# Patient Record
Sex: FEMALE | Race: WHITE | Employment: UNEMPLOYED | URBAN - NONMETROPOLITAN AREA
[De-identification: names, ages, dates, MRNs, and addresses within clinical notes are randomized per-mention and may not be internally consistent; named-entity substitution may affect disease eponyms.]

---

## 2017-01-05 ENCOUNTER — TELEPHONE (OUTPATIENT)
Dept: BEHAVIORAL HEALTH | Facility: OTHER | Age: 32
End: 2017-01-05

## 2017-01-05 NOTE — TELEPHONE ENCOUNTER
[facility]  @FLOW([id number])@      Social Work Care Navigator Note      Patient: Kina Pond  Date: January 5, 2017  Preferred Name: Kina    Previous PHQ-9:   PHQ-9 SCORE 9/15/2016 11/10/2016 12/1/2016   Total Score - - -   Total Score 16 16 9     Previous DANIEL-7:   DANIEL-7 SCORE 9/15/2016 11/10/2016 12/1/2016   Total Score 21 20 17     ACE LEVEL:  ACE Score (Last Two) 11/10/2016 12/1/2016   ACE Raw Score 28 32   Activation Score 50 62.6   ACE Level 2 3       Preferred Contact: @JACOB([id number])@  Type of Contact Today: Phone call (not reached/unavailable)      Data: (subjective / Objective):  Patient Goals Areas: @FLOW([id number])@  Patient Stated Goals: @FLOW([id number])@  Recent ED/IP Admission or Discharge?   None  Attempted to reach patient, but was unsuccessful.  Plan to attempt again.  Shannan Hirsch

## 2017-01-05 NOTE — TELEPHONE ENCOUNTER
"[facility]  @FLOW([id number])@      Social Work Care Navigator Note      Patient: Kina Pond  Date: January 5, 2017  Preferred Name: Kina    Previous PHQ-9:   PHQ-9 SCORE 9/15/2016 11/10/2016 12/1/2016   Total Score - - -   Total Score 16 16 9     Previous DANIEL-7:   DANIEL-7 SCORE 9/15/2016 11/10/2016 12/1/2016   Total Score 21 20 17     ACE LEVEL:  ACE Score (Last Two) 11/10/2016 12/1/2016   ACE Raw Score 28 32   Activation Score 50 62.6   ACE Level 2 3       Preferred Contact: @JACOB([id number])@  Type of Contact Today: Phone call (patient reached)      Data: (subjective / Objective):  Patient Goals Areas: @FLOW([id number])@  Patient Stated Goals: @FLOW([id number])@  Recent ED/IP Admission or Discharge?   None  Patient Goals Areas: @FLOW([id number])@  Patient Stated Goals: @FLOW([id number])@  Recent ED/IP Admission or Discharge?   None    Objectives Addressed Today:  Has an [facility] worker- Barb   [facility], once consent is signed will send DA     Current Stressors / Issues:  Still working on 's unemployment vs [facility] cash assistance- on the way to [employer] [place] to sign ROSA for them to talk to [facility]   Kina is having trouble controlling her anxiety, she has trouble doing anything that requires \"keeping count or thinking\"     Intervention:  Motivational Interviewing: Expressed Empathy/Understanding, Supported Autonomy, Collaboration, Evocation and Permission to raise concern or advise   Target Behavior(s): Explored thoughts and readiness to participate in individual therapy and Explored current sleep hygeine and patient's perception and knowledge of relationship to mood    Assessment: (Progress on Goals / Homework):  Working with [facility] worker on relaxation-     Plan: (Homework, other):   This writer will look into other relaxation strategies that might help her better  Patient was encouraged to continue to seek condition-related information and education.      Scheduled a " Clinic follow up appointment with TAMEKA SAN in 1 week     Patient has set self-identified goals and will monitor progress until the next appointment on: 01/10/17.     Shannan Hirsch, Social Work Care Coordinator               Next 5 appointments (look out 90 days)     Kenrick 10, 2017 11:30 AM   (Arrive by 11:15 AM)   Return Visit with Shannan Hirsch   [facility] ([facility])    [address] [place] [address] [phone]

## 2017-01-09 ENCOUNTER — TELEPHONE (OUTPATIENT)
Dept: BEHAVIORAL HEALTH | Facility: OTHER | Age: 32
End: 2017-01-09

## 2017-01-09 NOTE — TELEPHONE ENCOUNTER
Patient is coming in tomorrow for help in coordination with [facility] and unemployment of . She is having high anxiety at this time because of these external factors.

## 2017-01-10 ENCOUNTER — TELEPHONE (OUTPATIENT)
Dept: BEHAVIORAL HEALTH | Facility: OTHER | Age: 32
End: 2017-01-10

## 2017-01-10 ENCOUNTER — OFFICE VISIT (OUTPATIENT)
Dept: BEHAVIORAL HEALTH | Facility: OTHER | Age: 32
End: 2017-01-10
Attending: PHYSICIAN ASSISTANT
Payer: MEDICAID

## 2017-01-10 DIAGNOSIS — F48.9 DEFERRED DIAGNOSIS ON AXIS I: Primary | ICD-10-CM

## 2017-01-10 NOTE — PROGRESS NOTES
"[facility]         Social Work Care Navigator Note      Patient: Kina Pond  Date: January 10, 2017  Preferred Name: Kina    Previous PHQ-9:   PHQ-9 SCORE 9/15/2016 11/10/2016 12/1/2016   Total Score - - -   Total Score 16 16 9     Previous DANIEL-7:   DANIEL-7 SCORE 9/15/2016 11/10/2016 12/1/2016   Total Score 21 20 17     ACE LEVEL:  ACE Score (Last Two) 11/10/2016 12/1/2016   ACE Raw Score 28 32   Activation Score 50 62.6   ACE Level 2 3       Preferred Contact: @[facility]([id number])@  Type of Contact Today: Face to Face in Clinic      Data: (subjective / Objective):  Patient Goals Areas: Financial  Patient Stated Goals:\"need help with county\"    Recent ED/IP Admission or Discharge?   None  Patient Goals Areas:    Patient Stated Goals:    Recent ED/IP Admission or Discharge?   None    Objectives Addressed Today:  No MFIP- [facility] keeps asking for verifications, and then says they are not giving the right ones. Behind on rent, no money, no food. Asked family for     Current Stressors / Issues:  See above. \"When I go into the [facility] they think I am Lying\"    Intervention:  Motivational Interviewing: Expressed Empathy/Understanding, Supported Autonomy, Collaboration, Evocation and Open-ended questions   Target Behavior(s): Explored ways to be able to pay for medication    Assessment: (Progress on Goals / Homework): Will bring in all documentation from [facility]      Plan: (Homework, other): Will call the [facility] about what precisely is needed for MFIP arpit.  Patient was encouraged to continue to seek condition-related information and education.      Scheduled a Phone follow up appointment with TAMEKA SAN in 1 week     Patient has set self-identified goals and will monitor progress until the next appointment on: 01/17/17.     Shannan Hirsch, Social Work Care Coordinator                 HPI      ROS      Physical Exam      "

## 2017-01-12 ENCOUNTER — TRANSFERRED RECORDS (OUTPATIENT)
Dept: HEALTH INFORMATION MANAGEMENT | Facility: HOSPITAL | Age: 32
End: 2017-01-12

## 2017-01-12 ENCOUNTER — TELEPHONE (OUTPATIENT)
Dept: BEHAVIORAL HEALTH | Facility: OTHER | Age: 32
End: 2017-01-12

## 2017-01-12 NOTE — TELEPHONE ENCOUNTER
"[facility]  @FLOW([id number])@      Social Work Care Navigator Note      Patient: Kina Pond  Date: January 12, 2017  Preferred Name: Kina    Previous PHQ-9:   PHQ-9 SCORE 9/15/2016 11/10/2016 12/1/2016   Total Score - - -   Total Score 16 16 9     Previous DANIEL-7:   DANIEL-7 SCORE 9/15/2016 11/10/2016 12/1/2016   Total Score 21 20 17     ACE LEVEL:  ACE Score (Last Two) 11/10/2016 12/1/2016   ACE Raw Score 28 32   Activation Score 50 62.6   ACE Level 2 3       Preferred Contact: @JACOB([id number])@  Type of Contact Today: Phone call (patient reached)      Data: (subjective / Objective):  Patient Goals Areas: @FLOW([id number])@  Patient Stated Goals: @FLOW([id number])@  Recent ED/IP Admission or Discharge?   None  Patient Goals Areas: @FLOW([id number])@  Patient Stated Goals: @FLOW([id number])@  Recent ED/IP Admission or Discharge?   None    Patient called that she was reinstated with MFIP 1/11/17. She had brought to [facility] all the paperwork-to hand it in , inperson.  The worker was very curse with her, and told the patient she refused to call this writer back- because my County ROSA is only for \"healthcare\" . It is actually for care coordination. Kina has obtained [facility] back and can now get her medicines filled.    Scheduled a Phone follow up appointment with TAMEKA SAN in 1 week     Patient has set self-identified goals and will monitor progress until the next appointment on: 01/16/17.     Shannan Hirsch, Social Work Care Coordinator               Next 5 appointments (look out 90 days)     Jan 16, 2017  8:30 AM   (Arrive by 8:15 AM)   SHORT with DAYANNA Goodman   [facility] ([facility])    [address] [place] [address]   [phone]                  "

## 2017-01-14 ENCOUNTER — HOSPITAL ENCOUNTER (EMERGENCY)
Facility: HOSPITAL | Age: 32
Discharge: HOME | End: 2017-01-14
Attending: NURSE PRACTITIONER | Admitting: NURSE PRACTITIONER
Payer: MEDICAID

## 2017-01-14 VITALS
RESPIRATION RATE: 16 BRPM | DIASTOLIC BLOOD PRESSURE: 67 MMHG | TEMPERATURE: 99.2 F | OXYGEN SATURATION: 99 % | SYSTOLIC BLOOD PRESSURE: 108 MMHG | HEART RATE: 87 BPM

## 2017-01-14 DIAGNOSIS — M79.641 PAIN OF RIGHT HAND: ICD-10-CM

## 2017-01-14 PROCEDURE — 99214 OFFICE O/P EST MOD 30 MIN: CPT | Mod: 25

## 2017-01-14 PROCEDURE — 25000125 ZZHC RX 250: Performed by: NURSE PRACTITIONER

## 2017-01-14 PROCEDURE — 96372 THER/PROPH/DIAG INJ SC/IM: CPT

## 2017-01-14 PROCEDURE — 99213 OFFICE O/P EST LOW 20 MIN: CPT | Performed by: NURSE PRACTITIONER

## 2017-01-14 PROCEDURE — 73140 X-RAY EXAM OF FINGER(S): CPT | Mod: TC,RT

## 2017-01-14 RX ORDER — KETOROLAC TROMETHAMINE 30 MG/ML
60 INJECTION, SOLUTION INTRAMUSCULAR; INTRAVENOUS ONCE
Status: COMPLETED | OUTPATIENT
Start: 2017-01-14 | End: 2017-01-14

## 2017-01-14 RX ADMIN — KETOROLAC TROMETHAMINE 60 MG: 30 INJECTION, SOLUTION INTRAMUSCULAR; INTRAVENOUS at 17:31

## 2017-01-14 NOTE — ED AVS SNAPSHOT
[facility]    [address] [address]    Phone:  [phone]                                       Kina Pond   MRN: [id number]    Department:  [facility]   Date of Visit:  1/14/2017           Patient Information     Date Of Birth          1985        Your diagnoses for this visit were:     Pain of right hand 5th MCP joint       You were seen by Ketty Casey NP.      Follow-up Information     Follow up with [facility].    Specialty:  EMERGENCY MEDICINE    Why:  As needed, If symptoms worsen, or concerns develop    Contact information:    [address] [place] [address] [phone]    Additional information:    From [place]: Take [address]. Turn left at [address]. Turn left at the first stoplight on [address]. At the first stop sign, take a right onto [address]. Take a left into the parking lot and continue through until you reach the North enterance of the building.       From [place]: Take [address]. Take the [address] ramp towards [place]. Turn left onto [address]. Take a slight right onto [address]. Turn left at the first stoplight on [address]. At the first stop sign, take a right onto [address]. Take a left into the parking lot and continue through until you reach the North enterance of the building.       From [place]: Take [address]. Take a right at [address]. At the first stop sign, take a right onto [address]. Take a left into the parking lot and continue through until you reach the North enterance of the building.         Follow up with Makayla Khoury PA.    Specialty:  Family Practice    Why:  As needed, if symptoms do not improve    Contact information:    [facility]  [address]  [place] [address]  [phone]        Discharge References/Attachments     HAND SPRAIN (ENGLISH)       Future Appointments        Provider Department Dept Phone Center    1/16/2017 8:30 AM DAYANNA Cai [facility] [place] [phone] [facility]         Review of your medicines      Our records show that you are taking the medicines listed below. If these are incorrect, please call your family doctor or clinic.        Dose / Directions Last dose taken    ALPRAZolam 0.5 MG tablet   Commonly known as:  XANAX   Quantity:  15 tablet        TAKE 1 TABLET BY MOUTH TWICE DAILY AS NEEDED   Refills:  0        buPROPion 150 MG 12 hr tablet   Commonly known as:  WELLBUTRIN SR   Dose:  150 mg   Quantity:  60 tablet        Take 1 tablet (150 mg) by mouth 2 times daily   Refills:  1        escitalopram 5 MG tablet   Commonly known as:  LEXAPRO   Quantity:  30 tablet        TAKE 1 TABLET BY MOUTH DAILY   Refills:  3        etonogestrel 68 MG Impl   Commonly known as:  IMPLANON/NEXPLANON   Dose:  1 each        1 each (68 mg) by Subdermal route continuous   Refills:  0        polyethylene glycol powder   Commonly known as:  MIRALAX   Dose:  1 capful   Quantity:  510 g        Take 17 g (1 capful) by mouth daily As needed for irritable bowel sx.  If no bm in 2 day take one scoop   Refills:  1        traZODone 50 MG tablet   Commonly known as:  DESYREL   Quantity:  15 tablet        TAKE 1 TABLET(50 MG) BY MOUTH EVERY NIGHT AS NEEDED FOR SLEEP   Refills:  3                Orders Needing Specimen Collection     None      Pending Results     No orders found from 1/13/2017 to 1/15/2017.            Pending Culture Results     No orders found from 1/13/2017 to 1/15/2017.            Thank you for choosing [facility]       Thank you for choosing [facility] for your care. Our goal is always to provide you with excellent care. Hearing back from our patients is one way we can continue to improve our services. Please take a few minutes to complete the written survey that you may receive in the mail after you visit with us. Thank  "you!        [employer] Information     [employer] lets you send messages to your doctor, view your test results, renew your prescriptions, schedule appointments and more. To sign up, go to [email or web address] . Click on \"Log in\" on the left side of the screen, which will take you to the Welcome page. Then click on \"Sign up Now\" on the right side of the page.     You will be asked to enter the access code listed below, as well as some personal information. Please follow the directions to create your username and password.     Your access code is: [email or web address]  Expires: 2017  5:33 PM     Your access code will  in 90 days. If you need help or a new code, please call your [facility] clinic or [phone].        Care EveryWhere ID     This is your Care EveryWhere ID. This could be used by other organizations to access your [facility] medical records  [id number]        After Visit Summary       This is your record. Keep this with you and show to your community pharmacist(s) and doctor(s) at your next visit.                  "

## 2017-01-14 NOTE — ED NOTES
Pt presents today with spouse and children for right pinky finger pain after wrestling with kids (playfully) and she heard a pop.

## 2017-01-14 NOTE — ED AVS SNAPSHOT
[facility]    [address] [place] [address]    Phone:  [phone]                                       Kina Pond   MRN: [id number]    Department[facility]  [facility]   Date of Visit:  1/14/2017           After Visit Summary Signature Page     I have received my discharge instructions, and my questions have been answered. I have discussed any challenges I see with this plan with the nurse or doctor.    ..........................................................................................................................................  Patient/Patient Representative Signature      ..........................................................................................................................................  Patient Representative Print Name and Relationship to Patient    ..................................................               ................................................  Date                                            Time    ..........................................................................................................................................  Reviewed by Signature/Title    ...................................................              ..............................................  Date                                                            Time

## 2017-01-15 ASSESSMENT — ENCOUNTER SYMPTOMS
CONSTITUTIONAL NEGATIVE: 1
ARTHRALGIAS: 1

## 2017-01-15 NOTE — ED PROVIDER NOTES
History     Chief Complaint   Patient presents with     Hand Pain     The history is provided by the patient. No  was used.     Kina Pond is a 31 year old female who presents today with a CC of right 5th finger pain.  She states she was wrestling with her family, heard a pop in her right 5th finger and felt immediate pain.  She has not taken anything for pain.  No numbness or tingling.  Pain is 8/10.      I have reviewed the Medications, Allergies, Past Medical and Surgical History, and Social History in the Epic system.    Review of Systems   Constitutional: Negative.    Musculoskeletal: Positive for arthralgias.       Physical Exam   BP: 108/67 mmHg  Pulse: 87  Temp: 99.2  F (37.3  C)  Resp: 16  SpO2: 99 %    Physical Exam   Constitutional: She appears well-developed and well-nourished.   Cardiovascular: Normal rate.    Pulmonary/Chest: Effort normal.   Musculoskeletal:        Right hand: She exhibits decreased range of motion (5th finger) and bony tenderness (5th finger MCP joint with movement and palpation). She exhibits normal two-point discrimination, normal capillary refill, no deformity, no laceration and no swelling. Normal sensation noted. Decreased strength (5th finger due to pain) noted.   Nursing note and vitals reviewed.      ED Course   Procedures    Results for orders placed or performed during the hospital encounter of 01/14/17   XR Finger Right G/E 2 Views    Narrative    RIGHT FIFTH FINGER TWO VIEWS    HISTORY:  Fifth finger pain and injury.    COMPARISON:  None.    IMPRESSION:  NORMAL RIGHT FIFTH FINGER.  Exam Date: Jan 14, 2017 05:37:41 PM  Author: KAMARI SMITH  This report is preliminary and transcribed         Patient fitted with ulnar gutter splint    Medications   ketorolac (TORADOL) injection 60 mg (60 mg Intramuscular Given 1/14/17 1354)     Observed for a minimum of 20 minutes, tolerated medications well, no adverse efects noted, reports pain is improved on  discharge.    Assessments & Plan (with Medical Decision Making)     I have reviewed the nursing notes.    I have reviewed the findings, diagnosis, plan and need for follow up with the patient.  ASSESSMENT / PLAN:  (M79.641) Pain of right hand  Comment: 5th MCP joint  Plan:  Rest, ice or heat, elevate   Ibuprofen/tylenol as needed for pain   Splint as needed for comfort/support   Follow up with PCP in 1-2 weeks if symptoms are not improving, sooner if symptoms are worsening    Discharge Medication List as of 1/14/2017  5:33 PM          Final diagnoses:   Pain of right hand - 5th MCP joint       1/14/2017   [facility]      Ketty Casey NP  01/15/17 2486

## 2017-01-18 ENCOUNTER — OFFICE VISIT (OUTPATIENT)
Dept: FAMILY MEDICINE | Facility: OTHER | Age: 32
End: 2017-01-18
Attending: PHYSICIAN ASSISTANT
Payer: MEDICAID

## 2017-01-18 VITALS
RESPIRATION RATE: 16 BRPM | TEMPERATURE: 98.7 F | SYSTOLIC BLOOD PRESSURE: 80 MMHG | HEIGHT: 63 IN | DIASTOLIC BLOOD PRESSURE: 46 MMHG | OXYGEN SATURATION: 98 % | WEIGHT: 118 LBS | BODY MASS INDEX: 20.91 KG/M2 | HEART RATE: 92 BPM

## 2017-01-18 DIAGNOSIS — M54.42 CHRONIC BILATERAL LOW BACK PAIN WITH LEFT-SIDED SCIATICA: ICD-10-CM

## 2017-01-18 DIAGNOSIS — F51.02 TRANSIENT INSOMNIA: ICD-10-CM

## 2017-01-18 DIAGNOSIS — G89.29 CHRONIC BILATERAL LOW BACK PAIN WITH LEFT-SIDED SCIATICA: ICD-10-CM

## 2017-01-18 DIAGNOSIS — F33.1 MODERATE EPISODE OF RECURRENT MAJOR DEPRESSIVE DISORDER (H): ICD-10-CM

## 2017-01-18 DIAGNOSIS — F41.1 GAD (GENERALIZED ANXIETY DISORDER): ICD-10-CM

## 2017-01-18 DIAGNOSIS — F45.8 HYPERVENTILATION SYNDROME: ICD-10-CM

## 2017-01-18 DIAGNOSIS — Z71.89 ACP (ADVANCE CARE PLANNING): Primary | Chronic | ICD-10-CM

## 2017-01-18 PROCEDURE — 99212 OFFICE O/P EST SF 10 MIN: CPT

## 2017-01-18 PROCEDURE — 99214 OFFICE O/P EST MOD 30 MIN: CPT | Performed by: PHYSICIAN ASSISTANT

## 2017-01-18 RX ORDER — ESCITALOPRAM OXALATE 5 MG/1
5 TABLET ORAL DAILY
Qty: 30 TABLET | Refills: 11 | Status: SHIPPED | OUTPATIENT
Start: 2017-01-18 | End: 2017-04-14

## 2017-01-18 RX ORDER — CYCLOBENZAPRINE HCL 10 MG
5-10 TABLET ORAL 3 TIMES DAILY PRN
Qty: 30 TABLET | Refills: 1 | Status: SHIPPED | OUTPATIENT
Start: 2017-01-18

## 2017-01-18 RX ORDER — BUPROPION HYDROCHLORIDE 150 MG/1
150 TABLET, EXTENDED RELEASE ORAL 2 TIMES DAILY
Qty: 60 TABLET | Refills: 1 | Status: SHIPPED | OUTPATIENT
Start: 2017-01-18 | End: 2017-03-25

## 2017-01-18 RX ORDER — TRAZODONE HYDROCHLORIDE 50 MG/1
TABLET, FILM COATED ORAL
Qty: 30 TABLET | Refills: 3 | Status: SHIPPED | OUTPATIENT
Start: 2017-01-18 | End: 2017-04-14

## 2017-01-18 ASSESSMENT — PATIENT HEALTH QUESTIONNAIRE - PHQ9: 5. POOR APPETITE OR OVEREATING: NEARLY EVERY DAY

## 2017-01-18 ASSESSMENT — ANXIETY QUESTIONNAIRES
1. FEELING NERVOUS, ANXIOUS, OR ON EDGE: NEARLY EVERY DAY
7. FEELING AFRAID AS IF SOMETHING AWFUL MIGHT HAPPEN: NEARLY EVERY DAY
3. WORRYING TOO MUCH ABOUT DIFFERENT THINGS: NEARLY EVERY DAY
2. NOT BEING ABLE TO STOP OR CONTROL WORRYING: NEARLY EVERY DAY
GAD7 TOTAL SCORE: 20
5. BEING SO RESTLESS THAT IT IS HARD TO SIT STILL: MORE THAN HALF THE DAYS
6. BECOMING EASILY ANNOYED OR IRRITABLE: NEARLY EVERY DAY
IF YOU CHECKED OFF ANY PROBLEMS ON THIS QUESTIONNAIRE, HOW DIFFICULT HAVE THESE PROBLEMS MADE IT FOR YOU TO DO YOUR WORK, TAKE CARE OF THINGS AT HOME, OR GET ALONG WITH OTHER PEOPLE: SOMEWHAT DIFFICULT

## 2017-01-18 ASSESSMENT — PAIN SCALES - GENERAL: PAINLEVEL: NO PAIN (0)

## 2017-01-18 NOTE — PATIENT INSTRUCTIONS
Thank you for choosing [facility].   I appreciate the opportunity to serve you and look forward to supporting your healthcare needs in the future. Please contact me with any questions or concerns that you may have.    Sincerely,    Makayla Khoury RN, PA-C            Low Back Pain            What is low back pain?   Low back pain is pain and stiffness in the lower back. It is one of the most common reasons people miss work.   How does it occur?   Your lower back is called your lumbar spine. It is made up of 5 bones called lumbar vertebrae. In between the vertebrae are shock absorbers called disks. Back pain can occur from an injury to the vertebrae or when a disk bulges or herniates.   Low back pain is usually caused when a ligament or muscle holding a vertebra in its proper position is strained. Vertebrae are bones that make up the spinal column through which the spinal cord passes. When these muscles or ligaments become weak or strained, the spine loses its stability, resulting in pain.   Low back pain can occur if your job involves lifting and carrying heavy objects, or if you spend a lot of time sitting or standing in one position or bending over. It can be caused by a fall or by unusually strenuous exercise. It can be brought on by the tension and stress that cause headaches in some people. It can even be brought on by violent sneezing or coughing.   People who are overweight may have low back pain because of the added stress on their back.   Back pain may occur when the muscles, joints, bones, and connective tissues of the back become inflamed as a result of an infection or an immune system problem. Arthritic disorders as well as some congenital and degenerative conditions may cause back pain.   Back pain accompanied by loss of bladder or bowel control, trouble moving your legs, or numbness or tingling in your arms or legs requires immediate medical treatment.   What are the symptoms?   Symptoms  include:   pain in the back or legs   stiffness, spasm, or limited motion   The pain may be constant or may happen only in certain positions. It may get worse when you cough, sneeze, bend, twist, or strain during a bowel movement. The pain may be in only one spot or may spread to other areas, most commonly down the buttocks and into the back of the thigh.   A low back strain typically does not produce pain past the knee into the calf or foot. Tingling or numbness in the calf or foot may indicate a herniated disk or pinched nerve.   Be sure to see your healthcare provider if:   You have weakness in your leg, especially if you cannot lift your foot, because this may be a sign of nerve damage.   You have new bowel or bladder problems as well as back pain, which may be a sign of severe injury to your spinal cord.   You have pain that gets worse despite treatment.   How is it diagnosed?   Your healthcare provider will review your medical history and examine you. You may have X-rays, an MRI, CT scan, or a bone scan.   How is it treated?   To treat this condition:   Put an ice pack, gel pack, or package of frozen vegetables, wrapped in a cloth on the area every 3 to 4 hours, for up to 20 minutes at a time for the first 2 or 3 days.   Use a heating pad or hot water bottle. Don't let the heating pad get too hot, and don't fall asleep with it. You could get a burn.   Rest in bed on a firm mattress. Often it helps to lie on your back with your knees raised on a pillow. However, some people prefer to lie on their side with their knees bent. It's best to try to stay active, so try not to rest in bed longer than 1 to 2 days.   Take muscle relaxants as recommended by your healthcare provider.   Take an anti-inflammatory such as ibuprofen, or other medicine as directed by your provider. Nonsteroidal anti-inflammatory medicines (NSAIDs) may cause stomach bleeding and other problems. These risks increase with age. Read the label and  take as directed. Unless recommended by your healthcare provider, do not take for more than 10 days.   Get a back massage by a trained person.   Wear a belt or corset to support your back.   Do the exercises recommended by your provider. Your provider may also prescribe physical therapy.   Talk with a counselor, if your back pain is related to tension caused by emotional problems.   When the pain is gone, ask your healthcare provider about starting an exercise program such as the following:   Exercise moderately every day, using stretching and warm-up exercises suggested by your provider or physical therapist.   Exercise vigorously for about 30 minutes 3 times a week by walking, swimming, using a stationary bicycle, or doing low-impact aerobics.   Exercising regularly will not only help your back, it will also help keep you healthier overall.   How long will the effects last?   The effects of back pain last as long as the cause exists or until your body recovers from the strain, usually a day or two but sometimes weeks.   How can I take care of myself?   In addition to the treatment described above, keep in mind these suggestions:   Practice good posture. Stand with your head up, shoulders straight, chest forward, weight balanced evenly on both feet, and pelvis tucked in.   Lose weight if you are overweight   Keep your core muscles strong. These are your abdominal and back muscles.   Sleep without a pillow under your head.   Pain is the best way to  the pace you should set in increasing your activity and exercise. Minor discomfort, stiffness, soreness, and mild aches need not interfere with activity. However, limit your activities temporarily if:   Your symptoms return.   The pain increases when you are more active.   The pain increases within 24 hours after a new or higher level of activity.   When can I return to my normal activities?   Everyone recovers from an injury at a different rate. Return to your  activities depends on how soon your back recovers, not by how many days or weeks it has been since your injury has occurred. In general, the longer you have symptoms before you start treatment, the longer it will take to get better. The goal is to return to your normal activities as soon as is safely possible. If you return too soon you may worsen your injury.   It is important that you have fully recovered from your low back pain before you return to any strenuous activity. You must be able to have the same range of motion that you had before your injury. You must be able to walk and twist without pain.   What can I do to help prevent low back pain?   You can reduce the strain on your back by doing the following:   Don't push with your arms when you move a heavy object. Turn around and push backwards so the strain is taken by your legs.   Whenever you sit, sit in a straight-backed chair and hold your spine against the back of the chair.   Bend your knees and hips and keep your back straight when you lift a heavy object.   Avoid lifting heavy objects higher than your waist.   Hold packages you carry close to your body, with your arms bent.   Use a footrest for one foot when you stand or sit in one spot for a long time. This keeps your back straight.   Bend your knees when you bend over.   Sit close to the pedals when you drive and use your seat belt and a hard backrest or pillow.   Lie on your side with your knees bent when you sleep or rest. It may help to put a pillow between your knees.   Put a pillow under your knees when you sleep on your back.   Raise the foot of the bed 8 inches to discourage sleeping on your stomach unless you have other problems that require that you keep your head elevated.   To rest your back, hold each of these positions for 5?minutes or longer:   Lie on your back, bend your knees, and put pillows under your knees.   Lie on your back on the floor with a pillow under your neck. Bend your  knees to a 90-degree angle, and put your lower legs and feet on a chair.   Lie on your back, bend your knees, and bring one knee up to your chest and hold it there. Repeat with the other knee, then bring both knees to your chest. When holding your knee to your chest, grab your thigh rather than your lower leg to avoid over flexing your knee.     Published by [employer].  This content is reviewed periodically and is subject to change as new health information becomes available. The information is intended to inform and educate and is not a replacement for medical evaluation, advice, diagnosis or treatment by a healthcare professional.   Developed by Zakia Greene RN, [place], and [facility].   ? 2010 [facility] and/or its affiliates. All Rights Reserved.           Low Back Pain Exercise          Standing hamstring stretch: Put the heel of one leg on a stool about 15 inches high. Keep your leg straight. Lean forward, bending at the hips until you feel a mild stretch in the back of your thigh. Make sure you do not roll your shoulders or bend at the waist when doing this. You want to stretch your leg, not your lower back. Hold the stretch for 15 to 30 seconds. Repeat with each leg 3 times.   Cat and camel: Get down on your hands and knees. Let your stomach sag, allowing your back to curve downward. Hold this position for 5 seconds. Then arch your back and hold for 5 seconds. Do 3 sets of 10.   Quadruped arm and leg raise: Get down on your hands and knees. Pull in your belly button and tighten your abdominal muscles to stiffen your spine. While keeping your abdominals tight, raise one arm and the opposite leg away from you. Hold this position for 5 seconds. Lower your arm and leg slowly and change sides. Do this 10 times on each side.   Pelvic tilt: Lie on your back with your knees bent and your feet flat on the floor. Tighten your abdominal muscles and push your lower back into the floor. Hold this position for 5  seconds, then relax. Do 3 sets of 10.   Partial curl: Lie on your back with your knees bent and your feet flat on the floor. Tighten your stomach muscles. Tuck your chin to your chest. With your hands stretched out in front of you, curl your upper body forward until your shoulders clear the floor. Hold this position for 3 seconds. Don't hold your breath. It helps to breathe out as you lift your shoulders up. Relax back to the floor. Repeat 10 times. Build to 3 sets of 10. To challenge yourself, clasp your hands behind your head and keep your elbows out to the side.   Gluteal stretch: Lie on your back with both knees bent. Rest the ankle of one leg over the knee of your other leg. Grasp the thigh of the bottom leg and pull toward your chest. You will feel a stretch along the buttocks and possibly along the outside of your hip. Hold the stretch for 15 to 30 seconds. Repeat 3 times with each leg.   Extension exercise:   0. Lie face down on the floor for 5 minutes. If this hurts too much, lie face down with a pillow under your stomach. This should relieve your leg or back pain. When you can lie on your stomach for 5 minutes without a pillow, you can continue with Part B of this exercise.   0. After lying on your stomach for 5 minutes, prop yourself up on your elbows for another 5 minutes. If you can do this without having more leg or buttock pain, you can start doing part C of this exercise.   0. Lie on your stomach with your hands under your shoulders. Then press down on your hands and extend your elbows while keeping your hips flat on the floor. Hold for 1 second and lower yourself to the floor. Do 3 to 5 sets of 10 repetitions. Rest for 1 minute between sets. You should have no pain in your legs when you do this, but it is normal to feel some pain in your lower back.   Do this exercise several times a day.   Side plank: Lie on your side with your legs, hips, and shoulders in a straight line. Prop yourself up onto  your forearm so your elbow is directly under your shoulder. Lift your hips off the floor and balance on your forearm and the outside of your foot. Try to hold this position for 15 seconds, then slowly lower your hip to the ground. Switch sides and repeat. Work up to holding for 1 minute or longer. This exercise can be made easier by starting with your knees and hips flexed toward your chest.   Published by [employer].  This content is reviewed periodically and is subject to change as new health information becomes available. The information is intended to inform and educate and is not a replacement for medical evaluation, advice, diagnosis or treatment by a healthcare professional.   Written by Betsy Kidd, MS, PT, and Zakia Sampson PT, [facility], [facility], for [facility]   ? 2010 [facility] and/or its affiliates. All Rights Reserved.         Copyright   [employer] 2011

## 2017-01-18 NOTE — NURSING NOTE
"Chief Complaint   Patient presents with     Musculoskeletal Problem       Initial BP 80/46 mmHg  Pulse 92  Temp(Src) 98.7  F (37.1  C) (Tympanic)  Resp 16  Ht 5' 3\" (1.6 m)  Wt 118 lb (53.524 kg)  BMI 20.91 kg/m2  SpO2 98% Estimated body mass index is 20.91 kg/(m^2) as calculated from the following:    Height as of this encounter: 5' 3\" (1.6 m).    Weight as of this encounter: 118 lb (53.524 kg).  BP completed using cuff size: keena Patel LPN      "

## 2017-01-18 NOTE — MR AVS SNAPSHOT
After Visit Summary   1/18/2017    Kina Pond    MRN: [id number]           Patient Information     Date Of Birth          1985        Visit Information        Provider Department      1/18/2017 10:30 AM Makayla Khoury PA [facility]        Today's Diagnoses     ACP (advance care planning)    -  1     Hyperventilation syndrome         DANIEL (generalized anxiety disorder)         Moderate episode of recurrent major depressive disorder (H)         Transient insomnia           Care Instructions    Thank you for choosing [facility].   I appreciate the opportunity to serve you and look forward to supporting your healthcare needs in the future. Please contact me with any questions or concerns that you may have.    Sincerely,    Makayla Khoury RN, PA-C            Low Back Pain            What is low back pain?   Low back pain is pain and stiffness in the lower back. It is one of the most common reasons people miss work.   How does it occur?   Your lower back is called your lumbar spine. It is made up of 5 bones called lumbar vertebrae. In between the vertebrae are shock absorbers called disks. Back pain can occur from an injury to the vertebrae or when a disk bulges or herniates.   Low back pain is usually caused when a ligament or muscle holding a vertebra in its proper position is strained. Vertebrae are bones that make up the spinal column through which the spinal cord passes. When these muscles or ligaments become weak or strained, the spine loses its stability, resulting in pain.   Low back pain can occur if your job involves lifting and carrying heavy objects, or if you spend a lot of time sitting or standing in one position or bending over. It can be caused by a fall or by unusually strenuous exercise. It can be brought on by the tension and stress that cause headaches in some people. It can even be brought on by violent sneezing or coughing.   People who are  overweight may have low back pain because of the added stress on their back.   Back pain may occur when the muscles, joints, bones, and connective tissues of the back become inflamed as a result of an infection or an immune system problem. Arthritic disorders as well as some congenital and degenerative conditions may cause back pain.   Back pain accompanied by loss of bladder or bowel control, trouble moving your legs, or numbness or tingling in your arms or legs requires immediate medical treatment.   What are the symptoms?   Symptoms include:   pain in the back or legs   stiffness, spasm, or limited motion   The pain may be constant or may happen only in certain positions. It may get worse when you cough, sneeze, bend, twist, or strain during a bowel movement. The pain may be in only one spot or may spread to other areas, most commonly down the buttocks and into the back of the thigh.   A low back strain typically does not produce pain past the knee into the calf or foot. Tingling or numbness in the calf or foot may indicate a herniated disk or pinched nerve.   Be sure to see your healthcare provider if:   You have weakness in your leg, especially if you cannot lift your foot, because this may be a sign of nerve damage.   You have new bowel or bladder problems as well as back pain, which may be a sign of severe injury to your spinal cord.   You have pain that gets worse despite treatment.   How is it diagnosed?   Your healthcare provider will review your medical history and examine you. You may have X-rays, an MRI, CT scan, or a bone scan.   How is it treated?   To treat this condition:   Put an ice pack, gel pack, or package of frozen vegetables, wrapped in a cloth on the area every 3 to 4 hours, for up to 20 minutes at a time for the first 2 or 3 days.   Use a heating pad or hot water bottle. Don't let the heating pad get too hot, and don't fall asleep with it. You could get a burn.   Rest in bed on a firm  mattress. Often it helps to lie on your back with your knees raised on a pillow. However, some people prefer to lie on their side with their knees bent. It's best to try to stay active, so try not to rest in bed longer than 1 to 2 days.   Take muscle relaxants as recommended by your healthcare provider.   Take an anti-inflammatory such as ibuprofen, or other medicine as directed by your provider. Nonsteroidal anti-inflammatory medicines (NSAIDs) may cause stomach bleeding and other problems. These risks increase with age. Read the label and take as directed. Unless recommended by your healthcare provider, do not take for more than 10 days.   Get a back massage by a trained person.   Wear a belt or corset to support your back.   Do the exercises recommended by your provider. Your provider may also prescribe physical therapy.   Talk with a counselor, if your back pain is related to tension caused by emotional problems.   When the pain is gone, ask your healthcare provider about starting an exercise program such as the following:   Exercise moderately every day, using stretching and warm-up exercises suggested by your provider or physical therapist.   Exercise vigorously for about 30 minutes 3 times a week by walking, swimming, using a stationary bicycle, or doing low-impact aerobics.   Exercising regularly will not only help your back, it will also help keep you healthier overall.   How long will the effects last?   The effects of back pain last as long as the cause exists or until your body recovers from the strain, usually a day or two but sometimes weeks.   How can I take care of myself?   In addition to the treatment described above, keep in mind these suggestions:   Practice good posture. Stand with your head up, shoulders straight, chest forward, weight balanced evenly on both feet, and pelvis tucked in.   Lose weight if you are overweight   Keep your core muscles strong. These are your abdominal and back  muscles.   Sleep without a pillow under your head.   Pain is the best way to  the pace you should set in increasing your activity and exercise. Minor discomfort, stiffness, soreness, and mild aches need not interfere with activity. However, limit your activities temporarily if:   Your symptoms return.   The pain increases when you are more active.   The pain increases within 24 hours after a new or higher level of activity.   When can I return to my normal activities?   Everyone recovers from an injury at a different rate. Return to your activities depends on how soon your back recovers, not by how many days or weeks it has been since your injury has occurred. In general, the longer you have symptoms before you start treatment, the longer it will take to get better. The goal is to return to your normal activities as soon as is safely possible. If you return too soon you may worsen your injury.   It is important that you have fully recovered from your low back pain before you return to any strenuous activity. You must be able to have the same range of motion that you had before your injury. You must be able to walk and twist without pain.   What can I do to help prevent low back pain?   You can reduce the strain on your back by doing the following:   Don't push with your arms when you move a heavy object. Turn around and push backwards so the strain is taken by your legs.   Whenever you sit, sit in a straight-backed chair and hold your spine against the back of the chair.   Bend your knees and hips and keep your back straight when you lift a heavy object.   Avoid lifting heavy objects higher than your waist.   Hold packages you carry close to your body, with your arms bent.   Use a footrest for one foot when you stand or sit in one spot for a long time. This keeps your back straight.   Bend your knees when you bend over.   Sit close to the pedals when you drive and use your seat belt and a hard backrest or  pillow.   Lie on your side with your knees bent when you sleep or rest. It may help to put a pillow between your knees.   Put a pillow under your knees when you sleep on your back.   Raise the foot of the bed 8 inches to discourage sleeping on your stomach unless you have other problems that require that you keep your head elevated.   To rest your back, hold each of these positions for 5?minutes or longer:   Lie on your back, bend your knees, and put pillows under your knees.   Lie on your back on the floor with a pillow under your neck. Bend your knees to a 90-degree angle, and put your lower legs and feet on a chair.   Lie on your back, bend your knees, and bring one knee up to your chest and hold it there. Repeat with the other knee, then bring both knees to your chest. When holding your knee to your chest, grab your thigh rather than your lower leg to avoid over flexing your knee.     Published by [employer].  This content is reviewed periodically and is subject to change as new health information becomes available. The information is intended to inform and educate and is not a replacement for medical evaluation, advice, diagnosis or treatment by a healthcare professional.   Developed by Zakia Greene RN, [place], and [facility].   ? 2010 [facility] and/or its affiliates. All Rights Reserved.           Low Back Pain Exercise          Standing hamstring stretch: Put the heel of one leg on a stool about 15 inches high. Keep your leg straight. Lean forward, bending at the hips until you feel a mild stretch in the back of your thigh. Make sure you do not roll your shoulders or bend at the waist when doing this. You want to stretch your leg, not your lower back. Hold the stretch for 15 to 30 seconds. Repeat with each leg 3 times.   Cat and camel: Get down on your hands and knees. Let your stomach sag, allowing your back to curve downward. Hold this position for 5 seconds. Then arch your back and hold for 5  seconds. Do 3 sets of 10.   Quadruped arm and leg raise: Get down on your hands and knees. Pull in your belly button and tighten your abdominal muscles to stiffen your spine. While keeping your abdominals tight, raise one arm and the opposite leg away from you. Hold this position for 5 seconds. Lower your arm and leg slowly and change sides. Do this 10 times on each side.   Pelvic tilt: Lie on your back with your knees bent and your feet flat on the floor. Tighten your abdominal muscles and push your lower back into the floor. Hold this position for 5 seconds, then relax. Do 3 sets of 10.   Partial curl: Lie on your back with your knees bent and your feet flat on the floor. Tighten your stomach muscles. Tuck your chin to your chest. With your hands stretched out in front of you, curl your upper body forward until your shoulders clear the floor. Hold this position for 3 seconds. Don't hold your breath. It helps to breathe out as you lift your shoulders up. Relax back to the floor. Repeat 10 times. Build to 3 sets of 10. To challenge yourself, clasp your hands behind your head and keep your elbows out to the side.   Gluteal stretch: Lie on your back with both knees bent. Rest the ankle of one leg over the knee of your other leg. Grasp the thigh of the bottom leg and pull toward your chest. You will feel a stretch along the buttocks and possibly along the outside of your hip. Hold the stretch for 15 to 30 seconds. Repeat 3 times with each leg.   Extension exercise:   0. Lie face down on the floor for 5 minutes. If this hurts too much, lie face down with a pillow under your stomach. This should relieve your leg or back pain. When you can lie on your stomach for 5 minutes without a pillow, you can continue with Part B of this exercise.   0. After lying on your stomach for 5 minutes, prop yourself up on your elbows for another 5 minutes. If you can do this without having more leg or buttock pain, you can start doing part  C of this exercise.   0. Lie on your stomach with your hands under your shoulders. Then press down on your hands and extend your elbows while keeping your hips flat on the floor. Hold for 1 second and lower yourself to the floor. Do 3 to 5 sets of 10 repetitions. Rest for 1 minute between sets. You should have no pain in your legs when you do this, but it is normal to feel some pain in your lower back.   Do this exercise several times a day.   Side plank: Lie on your side with your legs, hips, and shoulders in a straight line. Prop yourself up onto your forearm so your elbow is directly under your shoulder. Lift your hips off the floor and balance on your forearm and the outside of your foot. Try to hold this position for 15 seconds, then slowly lower your hip to the ground. Switch sides and repeat. Work up to holding for 1 minute or longer. This exercise can be made easier by starting with your knees and hips flexed toward your chest.   Published by [employer].  This content is reviewed periodically and is subject to change as new health information becomes available. The information is intended to inform and educate and is not a replacement for medical evaluation, advice, diagnosis or treatment by a healthcare professional.   Written by Betsy Kidd, MS, PT, and Zakia Sampson PT, [facility], [facility], for [facility]   ? 2010 [facility] and/or its affiliates. All Rights Reserved.         Copyright   [employer] 2011                    Follow-ups after your visit        Who to contact     If you have questions or need follow up information about today's clinic visit or your schedule please contact [facility] directly at [phone].  Normal or non-critical lab and imaging results will be communicated to you by MyChart, letter or phone within 4 business days after the clinic has received the results. If you do not hear from us within 7 days, please contact the clinic through MyChart or phone.  "If you have a critical or abnormal lab result, we will notify you by phone as soon as possible.  Submit refill requests through [employer] or call your pharmacy and they will forward the refill request to us. Please allow 3 business days for your refill to be completed.          Additional Information About Your Visit        [facility] Information     [employer] lets you send messages to your doctor, view your test results, renew your prescriptions, schedule appointments and more. To sign up, go to [email or web address] . Click on \"Log in\" on the left side of the screen, which will take you to the Welcome page. Then click on \"Sign up Now\" on the right side of the page.     You will be asked to enter the access code listed below, as well as some personal information. Please follow the directions to create your username and password.     Your access code is: [email or web address]  Expires: 2017  5:33 PM     Your access code will  in 90 days. If you need help or a new code, please call your [place] clinic or [phone].        Care EveryWhere ID     This is your Care EveryWhere ID. This could be used by other organizations to access your [place] medical records  [id number]        Your Vitals Were     Pulse Temperature Respirations Height BMI (Body Mass Index) Pulse Oximetry    92 98.7  F (37.1  C) (Tympanic) 16 5' 3\" (1.6 m) 20.91 kg/m2 98%       Blood Pressure from Last 3 Encounters:   17 80/46   17 108/67   16 110/70    Weight from Last 3 Encounters:   17 118 lb (53.524 kg)   16 120 lb (54.432 kg)   16 120 lb (54.432 kg)              Today, you had the following     No orders found for display         Today's Medication Changes          These changes are accurate as of: 17 11:34 AM.  If you have any questions, ask your nurse or doctor.               These medicines have changed or have updated prescriptions.        Dose/Directions    escitalopram 5 MG tablet   Commonly known " as:  LEXAPRO   This may have changed:  See the new instructions.   Used for:  Moderate episode of recurrent major depressive disorder (H)   Changed by:  Makayla Khoury PA        Dose:  5 mg   Take 1 tablet (5 mg) by mouth daily   Quantity:  30 tablet   Refills:  11       traZODone 50 MG tablet   Commonly known as:  DESYREL   This may have changed:  See the new instructions.   Used for:  Transient insomnia   Changed by:  Makayla Khoury PA        TAKE 1 TABLET(50 MG) BY MOUTH EVERY NIGHT AS NEEDED FOR SLEEP   Quantity:  30 tablet   Refills:  3            Where to get your medicines      These medications were sent to [id number] - [place], [place] - [address] AT [facility] [address], [place] [address]     Phone:  [phone]    - buPROPion 150 MG 12 hr tablet  - escitalopram 5 MG tablet  - traZODone 50 MG tablet             Primary Care Provider Office Phone # Fax #    DAYANNA Goodman [phone]       [facility] [address] [place] [address] 2  [facility] [address]        Thank you!     Thank you for choosing [facility]  for your care. Our goal is always to provide you with excellent care. Hearing back from our patients is one way we can continue to improve our services. Please take a few minutes to complete the written survey that you may receive in the mail after your visit with us. Thank you!             Your Updated Medication List - Protect others around you: Learn how to safely use, store and throw away your medicines at www.disposemymeds.org.          This list is accurate as of: 1/18/17 11:34 AM.  Always use your most recent med list.                   Brand Name Dispense Instructions for use    ALPRAZolam 0.5 MG tablet    XANAX    15 tablet    TAKE 1 TABLET BY MOUTH TWICE DAILY AS NEEDED       buPROPion 150 MG 12 hr tablet    WELLBUTRIN SR    60 tablet    Take 1 tablet (150 mg) by mouth 2 times daily       escitalopram 5 MG tablet     LEXAPRO    30 tablet    Take 1 tablet (5 mg) by mouth daily       etonogestrel 68 MG Impl    IMPLANON/NEXPLANON     1 each (68 mg) by Subdermal route continuous       polyethylene glycol powder    MIRALAX    510 g    Take 17 g (1 capful) by mouth daily As needed for irritable bowel sx.  If no bm in 2 day take one scoop       traZODone 50 MG tablet    DESYREL    30 tablet    TAKE 1 TABLET(50 MG) BY MOUTH EVERY NIGHT AS NEEDED FOR SLEEP

## 2017-01-18 NOTE — PROGRESS NOTES
SUBJECTIVE:                                                    Kina Pond is a 31 year old female who presents to clinic today for the following health issues:      Musculoskeletal problem/pain      Duration: 1 year, worse over last 10 days    Description  Location: left leg, upper    Intensity:  severe    Accompanying signs and symptoms: Feels like an electric shock when it happens. Starts in upper leg and shots down leg    History  Previous similar problem: YES- has had for awhile but it is getting more often.   Previous evaluation:  none    Precipitating or alleviating factors:  Trauma or overuse: no   Aggravating factors include: standing long periods of time use to trigger this, now just randomly happen. Is always standing or walking when they hit.    Therapies tried and outcome: nothing       Depression and Anxiety Follow-Up    Status since last visit: Improved     Other associated symptoms:None    Complicating factors:     Significant life event: No     Current substance abuse: None    PHQ-9 SCORE 9/15/2016 11/10/2016 2016   Total Score - - -   Total Score 16 16 9     DANIEL-7 SCORE 9/15/2016 11/10/2016 2016   Total Score 21 20 17        PHQ-9  English      PHQ-9   Any Language     GAD7             Problem list and histories reviewed & adjusted, as indicated.  Additional history: as documented    Patient Active Problem List   Diagnosis     Chemical dependency (H)     Major depression     ACP (advance care planning)     Past Surgical History   Procedure Laterality Date     Breast surgery  2013     right lumpectomy     C anesth,vaginal delivery  2012     Pregnancy full-term; 4.00 hr labor ; APGAR:9 (1 min) 9 (5 min)  (10 min)  -1st degree perineal laceration - Pitocin     C anesth,vaginal delivery       Pregnancy; 12 hr labor ; 40 week 6 lb(s) 12 oz Male     C anesth,vaginal delivery       Pregnancy; , 34 week 4 lb(s) 12 oz Male; in hospital for 1 week, ruptured  membranes, resealed ([place], [place])     C induced abortn by dil/evac       Pregnancy, induced  due to birth defect; unknown sex       Cholecystectomy         Social History   Substance Use Topics     Smoking status: Current Every Day Smoker -- 0.50 packs/day for 20 years     Types: Cigarettes     Last Attempt to Quit: 2016     Smokeless tobacco: Never Used      Comment: cutting down to 4-5 cig daily      Alcohol Use: Yes      Comment: rare     Family History   Problem Relation Age of Onset     Neurologic Disorder Mother      ALS     Respiratory Father      COPD     Psychotic Disorder Brother      Breast Cancer Sister      Asthma Other      C.A.D. Father      Family H/O      Prostate Cancer Father          Current Outpatient Prescriptions   Medication Sig Dispense Refill     buPROPion (WELLBUTRIN SR) 150 MG 12 hr tablet Take 1 tablet (150 mg) by mouth 2 times daily 60 tablet 1     escitalopram (LEXAPRO) 5 MG tablet Take 1 tablet (5 mg) by mouth daily 30 tablet 11     traZODone (DESYREL) 50 MG tablet TAKE 1 TABLET(50 MG) BY MOUTH EVERY NIGHT AS NEEDED FOR SLEEP 30 tablet 3     ALPRAZolam (XANAX) 0.5 MG tablet TAKE 1 TABLET BY MOUTH TWICE DAILY AS NEEDED 15 tablet 0     etonogestrel (IMPLANON/NEXPLANON) 68 MG IMPL 1 each (68 mg) by Subdermal route continuous       polyethylene glycol (MIRALAX) powder Take 17 g (1 capful) by mouth daily As needed for irritable bowel sx.  If no bm in 2 day take one scoop 510 g 1     [DISCONTINUED] escitalopram (LEXAPRO) 5 MG tablet TAKE 1 TABLET BY MOUTH DAILY 30 tablet 3     [DISCONTINUED] traZODone (DESYREL) 50 MG tablet TAKE 1 TABLET(50 MG) BY MOUTH EVERY NIGHT AS NEEDED FOR SLEEP 15 tablet 3     [DISCONTINUED] buPROPion (WELLBUTRIN SR) 150 MG 12 hr tablet Take 1 tablet (150 mg) by mouth 2 times daily (Patient taking differently: Take 150 mg by mouth 2 times daily ) 60 tablet 1     No Known Allergies  BP Readings from Last 3 Encounters:   17 80/46   17  "108/67   12/05/16 110/70    Wt Readings from Last 3 Encounters:   01/18/17 118 lb (53.524 kg)   12/05/16 120 lb (54.432 kg)   11/09/16 120 lb (54.432 kg)                  Problem list, Medication list, Allergies, and Medical/Social/Surgical histories reviewed in [facility] and updated as appropriate.    ROS:  Constitutional, HEENT, cardiovascular, pulmonary, gi and gu systems are negative, except as otherwise noted.    OBJECTIVE:                                                    BP 80/46 mmHg  Pulse 92  Temp(Src) 98.7  F (37.1  C) (Tympanic)  Resp 16  Ht 5' 3\" (1.6 m)  Wt 118 lb (53.524 kg)  BMI 20.91 kg/m2  SpO2 98%  Body mass index is 20.91 kg/(m^2).  GENERAL APPEARANCE: healthy, alert and no distress  SKIN: no suspicious lesions or rashes  NEURO: Normal strength and tone, mentation intact and speech normal  PSYCH: feeling better.  Not weak or otherwise concerned.  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  Has [employer] worker.  Seeing Екатерина DICKERSON.   Not liking the building.       Diagnostic test results:  Diagnostic Test Results:  No results found for this or any previous visit (from the past 24 hour(s)).     ASSESSMENT/PLAN:                                                    1. ACP (advance care planning)  Has this at home and completed     2. Hyperventilation syndrome  Working with [employer] and panic has been so much better.   Has been able to  Handle stress until things calm down and then seems to fall apart.   - buPROPion (WELLBUTRIN SR) 150 MG 12 hr tablet; Take 1 tablet (150 mg) by mouth 2 times daily  Dispense: 60 tablet; Refill: 1    3. DANIEL (generalized anxiety disorder)  Doing well.   - buPROPion (WELLBUTRIN SR) 150 MG 12 hr tablet; Take 1 tablet (150 mg) by mouth 2 times daily  Dispense: 60 tablet; Refill: 1    4. Moderate episode of recurrent major depressive disorder (H)  Doing much better.  See us back as recommended.     - escitalopram (LEXAPRO) 5 MG tablet; Take 1 tablet (5 mg) by mouth daily  Dispense: " 30 tablet; Refill: 11      6. Chronic bilateral low back pain with left-sided sciatica  Hip is tight.  Not getting benefit from NSAID.  Given exercises and recommended she see us back if sx are not getting better.  Has no injury or trauma. Ice and heat.   - cyclobenzaprine (FLEXERIL) 10 MG tablet; Take 0.5-1 tablets (5-10 mg) by mouth 3 times daily as needed for muscle spasms  Dispense: 30 tablet; Refill: 1    See Patient Instructions    Makayla Khoury PA-C  [facility]

## 2017-01-19 ASSESSMENT — ANXIETY QUESTIONNAIRES: GAD7 TOTAL SCORE: 20

## 2017-01-19 ASSESSMENT — PATIENT HEALTH QUESTIONNAIRE - PHQ9: SUM OF ALL RESPONSES TO PHQ QUESTIONS 1-9: 14

## 2017-01-26 ENCOUNTER — TELEPHONE (OUTPATIENT)
Dept: BEHAVIORAL HEALTH | Facility: OTHER | Age: 32
End: 2017-01-26

## 2017-01-27 ENCOUNTER — APPOINTMENT (OUTPATIENT)
Dept: BEHAVIORAL HEALTH | Facility: OTHER | Age: 32
End: 2017-01-27
Attending: SOCIAL WORKER
Payer: MEDICAID

## 2017-01-31 PROCEDURE — S0280 MEDICAL HOME, INITIAL PLAN: HCPCS | Mod: U5 | Performed by: SOCIAL WORKER

## 2017-02-06 ENCOUNTER — TELEPHONE (OUTPATIENT)
Dept: BEHAVIORAL HEALTH | Facility: OTHER | Age: 32
End: 2017-02-06

## 2017-02-15 ENCOUNTER — OFFICE VISIT (OUTPATIENT)
Dept: FAMILY MEDICINE | Facility: OTHER | Age: 32
End: 2017-02-15
Attending: PHYSICIAN ASSISTANT
Payer: COMMERCIAL

## 2017-02-15 VITALS
TEMPERATURE: 98.1 F | HEART RATE: 95 BPM | SYSTOLIC BLOOD PRESSURE: 108 MMHG | HEIGHT: 63 IN | DIASTOLIC BLOOD PRESSURE: 62 MMHG | BODY MASS INDEX: 20.91 KG/M2 | WEIGHT: 118 LBS | OXYGEN SATURATION: 98 %

## 2017-02-15 DIAGNOSIS — L72.3 SEBACEOUS CYST: Primary | ICD-10-CM

## 2017-02-15 DIAGNOSIS — L70.0 CYSTIC ACNE: ICD-10-CM

## 2017-02-15 PROCEDURE — 99213 OFFICE O/P EST LOW 20 MIN: CPT | Performed by: PHYSICIAN ASSISTANT

## 2017-02-15 RX ORDER — CLINDAMYCIN PHOSPHATE 10 MG/G
GEL TOPICAL 2 TIMES DAILY
Qty: 60 G | Refills: 11 | Status: SHIPPED | OUTPATIENT
Start: 2017-02-15

## 2017-02-15 RX ORDER — CEPHALEXIN 500 MG/1
500 CAPSULE ORAL 3 TIMES DAILY
Qty: 30 CAPSULE | Refills: 0 | Status: SHIPPED | OUTPATIENT
Start: 2017-02-15 | End: 2017-03-26

## 2017-02-15 ASSESSMENT — PAIN SCALES - GENERAL: PAINLEVEL: NO PAIN (0)

## 2017-02-15 NOTE — NURSING NOTE
"Chief Complaint   Patient presents with     Mass     On Jawline, on going for a long time       Initial /62 (BP Location: Left arm, Cuff Size: Adult Regular)  Pulse 95  Temp 98.1  F (36.7  C) (Tympanic)  Ht 5' 3\" (1.6 m)  Wt 118 lb (53.5 kg)  SpO2 98%  BMI 20.9 kg/m2 Estimated body mass index is 20.9 kg/(m^2) as calculated from the following:    Height as of this encounter: 5' 3\" (1.6 m).    Weight as of this encounter: 118 lb (53.5 kg).  Medication Reconciliation: complete     Jennifer Sorensen      "

## 2017-02-15 NOTE — PATIENT INSTRUCTIONS
Sebaceous Cyst, Infected (Antibiotic Treatment)  A sebaceous cyst occurs when the opening of an oil gland in the skin becomes blocked. The gland swells with skin oil and dead skin cells. As it gets bigger, it looks like a small, painless lump under the skin. Sometimes the cyst can get infected. When the infection is limited, it can be treated with antibiotics alone. If the infection does not clear up with antibiotic treatment, the cyst will need to be drained by making a small incision using local anesthesia.  A sebaceous cyst is a term that most commonly refers to 2 types of cysts:  epidermoid  (from skin), and  pilar  (from hair follicles). Here is some information about these cysts:    A cyst is a sac filled with material that is often cheesy, fatty, oily, or fibrous material. The material in them can be thick (like cottage cheese) or liquid.    Sebaceous cysts form slowly under the skin, and can be found on most parts of the body. They are most commonly found in hairier areas like the scalp, face, upper back, and genitals.    You can usually move the cyst slightly if you try.    The cysts can be smaller than a pea or as large as a couple of inches.    The cysts are usually not painful, unless they become inflamed or infected.    The area around the cyst may smell bad. If the cyst breaks open, the material inside it often smells bad as well.  Treatment  While these cysts often go away without any treatment, they can get infected. If they drain by themselves, they may return. Resist the temptation to squeeze, pop, stick a needle in it or cut it open. This often leads to an infection and scarring. If the cyst gets severely inflamed or infected, you should seek medical treatment.  When the infection is early, and the size and location are limited, it can sometimes be treated with antibiotics alone. If the infection does not clear up with antibiotics and warm compresses, the cyst will need to be drained by making a  small incision using local anesthesia.  Home care  The following guidelines will help you care for your wound at home:    Again, resist the temptation to squeeze, pop, stick a needle in it, or cut it open; this often leads to a worsening infection and scarring.    Take the antibiotic as directed until it is all used up.    Soak the involved area in hot water or apply hot packs (small towel soaked in hot water) for 20 minutes at a time 3 to 4 times a day.    Apply antibiotic cream or ointment 3 times a day.    You may use acetaminophen or ibuprofen to control pain, unless another medicine was given. If you have chronic liver or kidney disease or ever had a stomach ulcer or GI bleeding, talk with your doctor before using these medicines.  Preventing future infection  Once this infection has healed, observe the following to reduce the risk of future infections:    Keep the area of the cyst clean by bathing or showering daily.    Avoid tight fitting clothing in the area of the cyst.  Follow-up care  Follow up with your doctor as advised by our staff. If a gauze packing was inserted in your wound, it should be removed in 1-2 days. Check your wound every day for the signs listed below.  When to seek medical care  Get prompt medical attention if any of the following occur:    Pus coming from the cyst    Increasing redness around the wound    Increasing local pain or swelling    Fever of 100.4 F (38 C) or higher, or as directed by your health care provider    4396-7244 [employer]. [address] [place] [address], [place] [address]. All rights reserved. This information is not intended as a substitute for professional medical care. Always follow your healthcare professional's instructions.

## 2017-02-15 NOTE — MR AVS SNAPSHOT
After Visit Summary   2/15/2017    Kina Pond    MRN: [id number]           Patient Information     Date Of Birth          1985        Visit Information        Provider Department      2/15/2017 11:00 AM Makayla Khoury PA [facility]        Today's Diagnoses     Sebaceous cyst    -  1    Cystic acne          Care Instructions      Sebaceous Cyst, Infected (Antibiotic Treatment)  A sebaceous cyst occurs when the opening of an oil gland in the skin becomes blocked. The gland swells with skin oil and dead skin cells. As it gets bigger, it looks like a small, painless lump under the skin. Sometimes the cyst can get infected. When the infection is limited, it can be treated with antibiotics alone. If the infection does not clear up with antibiotic treatment, the cyst will need to be drained by making a small incision using local anesthesia.  A sebaceous cyst is a term that most commonly refers to 2 types of cysts:  epidermoid  (from skin), and  pilar  (from hair follicles). Here is some information about these cysts:    A cyst is a sac filled with material that is often cheesy, fatty, oily, or fibrous material. The material in them can be thick (like cottage cheese) or liquid.    Sebaceous cysts form slowly under the skin, and can be found on most parts of the body. They are most commonly found in hairier areas like the scalp, face, upper back, and genitals.    You can usually move the cyst slightly if you try.    The cysts can be smaller than a pea or as large as a couple of inches.    The cysts are usually not painful, unless they become inflamed or infected.    The area around the cyst may smell bad. If the cyst breaks open, the material inside it often smells bad as well.  Treatment  While these cysts often go away without any treatment, they can get infected. If they drain by themselves, they may return. Resist the temptation to squeeze, pop, stick a needle in it or cut it  open. This often leads to an infection and scarring. If the cyst gets severely inflamed or infected, you should seek medical treatment.  When the infection is early, and the size and location are limited, it can sometimes be treated with antibiotics alone. If the infection does not clear up with antibiotics and warm compresses, the cyst will need to be drained by making a small incision using local anesthesia.  Home care  The following guidelines will help you care for your wound at home:    Again, resist the temptation to squeeze, pop, stick a needle in it, or cut it open; this often leads to a worsening infection and scarring.    Take the antibiotic as directed until it is all used up.    Soak the involved area in hot water or apply hot packs (small towel soaked in hot water) for 20 minutes at a time 3 to 4 times a day.    Apply antibiotic cream or ointment 3 times a day.    You may use acetaminophen or ibuprofen to control pain, unless another medicine was given. If you have chronic liver or kidney disease or ever had a stomach ulcer or GI bleeding, talk with your doctor before using these medicines.  Preventing future infection  Once this infection has healed, observe the following to reduce the risk of future infections:    Keep the area of the cyst clean by bathing or showering daily.    Avoid tight fitting clothing in the area of the cyst.  Follow-up care  Follow up with your doctor as advised by our staff. If a gauze packing was inserted in your wound, it should be removed in 1-2 days. Check your wound every day for the signs listed below.  When to seek medical care  Get prompt medical attention if any of the following occur:    Pus coming from the cyst    Increasing redness around the wound    Increasing local pain or swelling    Fever of 100.4 F (38 C) or higher, or as directed by your health care provider    4634-1479 [employer]. [address] [place] [address], [place] [address]. All rights  "reserved. This information is not intended as a substitute for professional medical care. Always follow your healthcare professional's instructions.              Follow-ups after your visit        Your next 10 appointments already scheduled     Mar 16, 2017 10:00 AM CDT   (Arrive by 9:45 AM)   New Visit with Christine Clifford, [facility]   [facility] ([facility])    [address] [place] [id number] [phone]              Who to contact     If you have questions or need follow up information about today's clinic visit or your schedule please contact [facility] directly at [phone].  Normal or non-critical lab and imaging results will be communicated to you by SpotBankshart, letter or phone within 4 business days after the clinic has received the results. If you do not hear from us within 7 days, please contact the clinic through [employer] or phone. If you have a critical or abnormal lab result, we will notify you by phone as soon as possible.  Submit refill requests through [employer] or call your pharmacy and they will forward the refill request to us. Please allow 3 business days for your refill to be completed.          Additional Information About Your Visit        MyChart Information     [employer] lets you send messages to your doctor, view your test results, renew your prescriptions, schedule appointments and more. To sign up, go to [email or web address] . Click on \"Log in\" on the left side of the screen, which will take you to the Welcome page. Then click on \"Sign up Now\" on the right side of the page.     You will be asked to enter the access code listed below, as well as some personal information. Please follow the directions to create your username and password.     Your access code is: [email or web address]  Expires: 2017  5:33 PM     Your access code will  in 90 days. If you need help or a new code, please call your [facility] or [phone].        Care " "EveryWhere ID     This is your Care EveryWhere ID. This could be used by other organizations to access your [facility] [place] medical records  [id number]        Your Vitals Were     Pulse Temperature Height Pulse Oximetry BMI (Body Mass Index)       95 98.1  F (36.7  C) (Tympanic) 5' 3\" (1.6 m) 98% 20.9 kg/m2        Blood Pressure from Last 3 Encounters:   02/15/17 108/62   01/18/17 (!) 80/46   01/14/17 108/67    Weight from Last 3 Encounters:   02/15/17 118 lb (53.5 kg)   01/18/17 118 lb (53.5 kg)   12/05/16 120 lb (54.4 kg)              Today, you had the following     No orders found for display         Today's Medication Changes          These changes are accurate as of: 2/15/17 11:01 AM.  If you have any questions, ask your nurse or doctor.               Start taking these medicines.        Dose/Directions    cephALEXin 500 MG capsule   Commonly known as:  KEFLEX   Used for:  Sebaceous cyst   Started by:  Makayla Khoury PA        Dose:  500 mg   Take 1 capsule (500 mg) by mouth 3 times daily   Quantity:  30 capsule   Refills:  0       clindamycin 1 % topical gel   Commonly known as:  CLINDAMAX   Used for:  Cystic acne   Started by:  Makayla Khoury PA        Apply topically 2 times daily   Quantity:  60 g   Refills:  11            Where to get your medicines      These medications were sent to [id number] - [place], [place] - [address] AT [facility] [address], [place] [address]     Phone:  [phone]     cephALEXin 500 MG capsule    clindamycin 1 % topical gel                Primary Care Provider Office Phone # Fax #    DAYANNA Goodman [phone]       [facility] [address] [facility]  [place] [address]        Thank you!     Thank you for choosing [facility]  for your care. Our goal is always to provide you with excellent care. Hearing back from our patients is one way we can continue to improve our services. Please take a few " minutes to complete the written survey that you may receive in the mail after your visit with us. Thank you!             Your Updated Medication List - Protect others around you: Learn how to safely use, store and throw away your medicines at www.disposemymeds.org.          This list is accurate as of: 2/15/17 11:01 AM.  Always use your most recent med list.                   Brand Name Dispense Instructions for use    ALPRAZolam 0.5 MG tablet    XANAX    15 tablet    TAKE 1 TABLET BY MOUTH TWICE DAILY AS NEEDED       buPROPion 150 MG 12 hr tablet    WELLBUTRIN SR    60 tablet    Take 1 tablet (150 mg) by mouth 2 times daily       cephALEXin 500 MG capsule    KEFLEX    30 capsule    Take 1 capsule (500 mg) by mouth 3 times daily       clindamycin 1 % topical gel    CLINDAMAX    60 g    Apply topically 2 times daily       cyclobenzaprine 10 MG tablet    FLEXERIL    30 tablet    Take 0.5-1 tablets (5-10 mg) by mouth 3 times daily as needed for muscle spasms       escitalopram 5 MG tablet    LEXAPRO    30 tablet    Take 1 tablet (5 mg) by mouth daily       etonogestrel 68 MG Impl    IMPLANON/NEXPLANON     1 each (68 mg) by Subdermal route continuous       polyethylene glycol powder    MIRALAX    510 g    Take 17 g (1 capful) by mouth daily As needed for irritable bowel sx.  If no bm in 2 day take one scoop       traZODone 50 MG tablet    DESYREL    30 tablet    TAKE 1 TABLET(50 MG) BY MOUTH EVERY NIGHT AS NEEDED FOR SLEEP

## 2017-02-15 NOTE — PROGRESS NOTES
SUBJECTIVE:                                                    Kina Pond is a 31 year old female who presents to clinic today for the following health issues:    Lump on Left Jawline      Duration: On going issue    Description (location/character/radiation): Left side of Jaw    Intensity:  moderate    Accompanying signs and symptoms: Pain with talking and eating    History (similar episodes/previous evaluation): None    Precipitating or alleviating factors: None    Therapies tried and outcome: None           Problem list and histories reviewed & adjusted, as indicated.  Additional history: as documented    Patient Active Problem List   Diagnosis     Chemical dependency (H)     Major depression     ACP (advance care planning)     Past Surgical History   Procedure Laterality Date     Breast surgery  2013     right lumpectomy     C anesth,vaginal delivery  2012     Pregnancy full-term; 4.00 hr labor ; APGAR:9 (1 min) 9 (5 min)  (10 min)  -1st degree perineal laceration - Pitocin     C anesth,vaginal delivery       Pregnancy; 12 hr labor ; 40 week 6 lb(s) 12 oz Male     C anesth,vaginal delivery       Pregnancy; , 34 week 4 lb(s) 12 oz Male; in hospital for 1 week, ruptured membranes, resealed ([place], [place])     C induced abortn by dil/evac       Pregnancy, induced  due to birth defect; unknown sex       Cholecystectomy         Social History   Substance Use Topics     Smoking status: Current Every Day Smoker     Packs/day: 0.50     Years: 20.00     Types: Cigarettes     Last attempt to quit: 2016     Smokeless tobacco: Never Used      Comment: cutting down to 4-5 cig daily      Alcohol use Yes      Comment: rare     Family History   Problem Relation Age of Onset     Neurologic Disorder Mother      ALS     Respiratory Father      COPD     Psychotic Disorder Brother      Breast Cancer Sister      Asthma Other      C.A.D. Father      Family H/O      Prostate Cancer  "Father          Current Outpatient Prescriptions   Medication Sig Dispense Refill     ALPRAZolam (XANAX) 0.5 MG tablet TAKE 1 TABLET BY MOUTH TWICE DAILY AS NEEDED 15 tablet 0     buPROPion (WELLBUTRIN SR) 150 MG 12 hr tablet Take 1 tablet (150 mg) by mouth 2 times daily 60 tablet 1     escitalopram (LEXAPRO) 5 MG tablet Take 1 tablet (5 mg) by mouth daily 30 tablet 11     traZODone (DESYREL) 50 MG tablet TAKE 1 TABLET(50 MG) BY MOUTH EVERY NIGHT AS NEEDED FOR SLEEP 30 tablet 3     cyclobenzaprine (FLEXERIL) 10 MG tablet Take 0.5-1 tablets (5-10 mg) by mouth 3 times daily as needed for muscle spasms 30 tablet 1     etonogestrel (IMPLANON/NEXPLANON) 68 MG IMPL 1 each (68 mg) by Subdermal route continuous       polyethylene glycol (MIRALAX) powder Take 17 g (1 capful) by mouth daily As needed for irritable bowel sx.  If no bm in 2 day take one scoop 510 g 1     No Known Allergies  BP Readings from Last 3 Encounters:   02/15/17 108/62   01/18/17 (!) 80/46   01/14/17 108/67    Wt Readings from Last 3 Encounters:   02/15/17 118 lb (53.5 kg)   01/18/17 118 lb (53.5 kg)   12/05/16 120 lb (54.4 kg)                  Problem list, Medication list, Allergies, and Medical/Social/Surgical histories reviewed in [facility] and updated as appropriate.    ROS:  Constitutional, neuro, ENT, endocrine, pulmonary, cardiac, gastrointestinal, genitourinary, musculoskeletal, integument and psychiatric systems are negative, except as otherwise noted.    OBJECTIVE:                                                    /62 (BP Location: Left arm, Cuff Size: Adult Regular)  Pulse 95  Temp 98.1  F (36.7  C) (Tympanic)  Ht 5' 3\" (1.6 m)  Wt 118 lb (53.5 kg)  SpO2 98%  BMI 20.9 kg/m2  Body mass index is 20.9 kg/(m^2).  GENERAL APPEARANCE: healthy, alert and no distress  EYES: Eyes grossly normal to inspection, PERRL and conjunctivae and sclerae normal  HENT: ear canals and TM's normal and nose and mouth without ulcers or lesions  NECK: no " adenopathy, no asymmetry, masses, or scars and thyroid normal to palpation  RESP: lungs clear to auscultation - no rales, rhonchi or wheezes  CV: regular rates and rhythm, normal S1 S2, no S3 or S4 and no murmur, click or rub  MS: extremities normal- no gross deformities noted  SKIN: redness on her left angle of her jaw.  Swelling and cystic lesion (sebaceous) is present.  Tender to touch. Nothing is open or expressed.   PSYCH: mentation appears normal and affect normal/bright    Diagnostic test results:  Diagnostic Test Results:  none      ASSESSMENT/PLAN:                                                    1. Sebaceous cyst  She has a large infected cyst.  Picking at this and making it larger.  Recommended removal.  She will be given Keflex an then can have this take out.  Has other cystic acne on face and will give clindamycin for this.    - cephALEXin (KEFLEX) 500 MG capsule; Take 1 capsule (500 mg) by mouth 3 times daily  Dispense: 30 capsule; Refill: 0      See Patient Instructions    DAYANNA Cai  [facility]

## 2017-02-16 ENCOUNTER — TELEPHONE (OUTPATIENT)
Dept: BEHAVIORAL HEALTH | Facility: OTHER | Age: 32
End: 2017-02-16

## 2017-02-16 ENCOUNTER — OFFICE VISIT (OUTPATIENT)
Dept: BEHAVIORAL HEALTH | Facility: OTHER | Age: 32
End: 2017-02-16
Attending: PHYSICIAN ASSISTANT
Payer: COMMERCIAL

## 2017-02-16 ENCOUNTER — TELEPHONE (OUTPATIENT)
Dept: FAMILY MEDICINE | Facility: OTHER | Age: 32
End: 2017-02-16

## 2017-02-16 DIAGNOSIS — F48.9 DEFERRED DIAGNOSIS ON AXIS I: Primary | ICD-10-CM

## 2017-02-16 DIAGNOSIS — L72.3 SEBACEOUS CYST: Primary | ICD-10-CM

## 2017-02-16 NOTE — TELEPHONE ENCOUNTER
Original order was for general surgery to remove cyst on Jaw line. They will not do anything on the face. Patient needs to see Dermatology for this. Corrected referral put in for Dr Raymond.  Ericka Patel LPN

## 2017-02-16 NOTE — LETTER
"Behavioral Health Home ([facility]): Health Action Plan     [facility]      Name: Kina NGUYEN Salty  Preferred Name: Kina  : 1985  MRN: [id number]    How to Contact me          Home Phone [phone]   Mobile [phone]     Ok to contact me by email? No   *Signed & Scanned Consent form Required*   No e-mail address on record       My Goals   Goal Areas: Mental Health   Patient stated goals: \"I want to get my CNA, but it costs $900.00, I am afraid that I will waste my money\"   Strengths related to each goal: Budget, trying new things-    Services and Supports Needed: Stay in therapy to be able to work on anxiety leaving the house, and saving $900.00 so in 6 months I can go to school.   Activities / Actions of Team to support goal(s): remind Kina that this a goal.   Activities / Actions of Patient / Parent / Guardian to support goal(s): Go to therapy    Recommended Referral  Tobacco cessation referrals made?: Yes (see comments)  Mental Health / Chemical Dependency Referrals: Yes  Substance Use Referrals: Behavioral Health Clinician  Mental Health Referrals: MH Services: [facility], [facility], Community MH Provider         My Team Members and Their Contact Information  Patient Care Team       Relationship Specialty Notifications Start End    Makayla Khoury, PA PCP - General   5/15/13     Phone: [phone] Fax: [phone]         [facility] [address] [place] [address]    Shannan Hirsch   Abnormal results only, Admissions 16     Pamela Peters, [facility] Behavioral Health Clinician Licensed Mental Health Abnormal results only, Admissions 16     Phone: [phone]          [facility] [address] [place] [address]          My Wellness Plan                Kina WENDY Pond co-developed the Health Action Plan with the [facility] Team and received a copy of this document.  Date Health Action Plan Completed/Updated: 17                "

## 2017-02-16 NOTE — PROGRESS NOTES
"Behavioral Health Home Services         Social Work Care Navigator Note      Patient: Kina Pond  Date: February 16, 2017  Preferred Name: Kina    Previous PHQ-9:   PHQ-9 SCORE 11/10/2016 12/1/2016 1/18/2017   Total Score - - -   Total Score 16 9 14     Previous DANIEL-7:   DANIEL-7 SCORE 11/10/2016 12/1/2016 1/18/2017   Total Score 20 17 20     ACE LEVEL:  ACE Score (Last Two) 11/10/2016 12/1/2016   ACE Raw Score 28 32   Activation Score 50 62.6   ACE Level 2 3       Preferred Contact: @[facility]([phone])@  Type of Contact Today: Face to Face in Clinic      Data: (subjective / Objective):  Patient Goals Areas: Goal Areas: Mental Health  Patient Stated Goals: Patient stated goals: \"I want to get my CNA, but it costs $900.00, I am afraid that I will waste my money\"  Recent ED/IP Admission or Discharge?   None  Patient Goals Areas: Goal Areas: Mental Health  Patient Stated Goals: Patient stated goals: \"I want to get my CNA, but it costs $900.00, I am afraid that I will waste my money\"  Recent ED/IP Admission or Discharge?   None    Objectives Addressed Today:  HAP    Current Stressors / Issues:  Stress about goal    Intervention:  Motivational Interviewing: Expressed Empathy/Understanding, Supported Autonomy, Collaboration, Evocation, Permission to raise concern or advise and Reframe   Target Behavior(s): Explored current social supports and reinforced opportunities to increase engagement    Assessment: (Progress on Goals / Homework):  Work on coping skills for anxiety about leaving home with therapist    Plan: (Homework, other): as above  Patient was encouraged to continue to seek condition-related information and education.      Scheduled a Phone follow up appointment with TAMEKA SAN in 2 weeks     Patient has set self-identified goals and will monitor progress until the next appointment on: 3/1/17.     Shannan Hirsch, Social Work Care Coordinator                 "

## 2017-02-16 NOTE — MR AVS SNAPSHOT
"              After Visit Summary   2/16/2017    Kina Pond    MRN: [id number]           Patient Information     Date Of Birth          1985        Visit Information        Provider Department      2/16/2017 10:00 AM Shannan Hirsch [facility]        Today's Diagnoses     Deferred diagnosis on axis I    -  1       Follow-ups after your visit        Your next 10 appointments already scheduled     Mar 16, 2017 10:00 AM CDT   (Arrive by 9:45 AM)   New Visit with Christine Clifford [facility]   [facility] [place] [facility] ([facility])    [address] [place] [id number] [phone]              Who to contact     If you have questions or need follow up information about today's clinic visit or your schedule please contact [facility] directly at [phone].  Normal or non-critical lab and imaging results will be communicated to you by MyChart, letter or phone within 4 business days after the clinic has received the results. If you do not hear from us within 7 days, please contact the clinic through MyChart or phone. If you have a critical or abnormal lab result, we will notify you by phone as soon as possible.  Submit refill requests through UXCam or call your pharmacy and they will forward the refill request to us. Please allow 3 business days for your refill to be completed.          Additional Information About Your Visit        MyChart Information     UXCam lets you send messages to your doctor, view your test results, renew your prescriptions, schedule appointments and more. To sign up, go to [email or web address] . Click on \"Log in\" on the left side of the screen, which will take you to the Welcome page. Then click on \"Sign up Now\" on the right side of the page.     You will be asked to enter the access code listed below, as well as some personal information. Please follow the directions to create your username and password.     Your access code is: " [email or web address]  Expires: 2017  5:33 PM     Your access code will  in 90 days. If you need help or a new code, please call your [facility] clinic or [phone].        Care EveryWhere ID     This is your Care EveryWhere ID. This could be used by other organizations to access your [facility] medical records  [id number]         Blood Pressure from Last 3 Encounters:   02/15/17 108/62   17 (!) 80/46   17 108/67    Weight from Last 3 Encounters:   02/15/17 118 lb (53.5 kg)   17 118 lb (53.5 kg)   16 120 lb (54.4 kg)              Today, you had the following     No orders found for display       Primary Care Provider Office Phone # Fax #    MakaylaDAYANNA [place] [phone]       [facility] [address]  [facility] [address]        Thank you!     Thank you for choosing [facility]  for your care. Our goal is always to provide you with excellent care. Hearing back from our patients is one way we can continue to improve our services. Please take a few minutes to complete the written survey that you may receive in the mail after your visit with us. Thank you!             Your Updated Medication List - Protect others around you: Learn how to safely use, store and throw away your medicines at www.disposemymeds.org.          This list is accurate as of: 17 10:19 AM.  Always use your most recent med list.                   Brand Name Dispense Instructions for use    ALPRAZolam 0.5 MG tablet    XANAX    15 tablet    TAKE 1 TABLET BY MOUTH TWICE DAILY AS NEEDED       buPROPion 150 MG 12 hr tablet    WELLBUTRIN SR    60 tablet    Take 1 tablet (150 mg) by mouth 2 times daily       cephALEXin 500 MG capsule    KEFLEX    30 capsule    Take 1 capsule (500 mg) by mouth 3 times daily       clindamycin 1 % topical gel    CLINDAMAX    60 g    Apply topically 2 times daily       cyclobenzaprine 10 MG tablet    FLEXERIL    30 tablet    Take 0.5-1 tablets  (5-10 mg) by mouth 3 times daily as needed for muscle spasms       escitalopram 5 MG tablet    LEXAPRO    30 tablet    Take 1 tablet (5 mg) by mouth daily       etonogestrel 68 MG Impl    IMPLANON/NEXPLANON     1 each (68 mg) by Subdermal route continuous       polyethylene glycol powder    MIRALAX    510 g    Take 17 g (1 capful) by mouth daily As needed for irritable bowel sx.  If no bm in 2 day take one scoop       traZODone 50 MG tablet    DESYREL    30 tablet    TAKE 1 TABLET(50 MG) BY MOUTH EVERY NIGHT AS NEEDED FOR SLEEP

## 2017-03-25 DIAGNOSIS — F41.1 GAD (GENERALIZED ANXIETY DISORDER): ICD-10-CM

## 2017-03-25 DIAGNOSIS — F45.8 HYPERVENTILATION SYNDROME: ICD-10-CM

## 2017-03-26 ENCOUNTER — HOSPITAL ENCOUNTER (EMERGENCY)
Facility: HOSPITAL | Age: 32
Discharge: HOME | End: 2017-03-26
Attending: PHYSICIAN ASSISTANT | Admitting: PHYSICIAN ASSISTANT
Payer: COMMERCIAL

## 2017-03-26 VITALS
DIASTOLIC BLOOD PRESSURE: 82 MMHG | SYSTOLIC BLOOD PRESSURE: 104 MMHG | TEMPERATURE: 97.8 F | OXYGEN SATURATION: 98 % | HEART RATE: 70 BPM | RESPIRATION RATE: 16 BRPM

## 2017-03-26 DIAGNOSIS — F33.1 MAJOR DEPRESSIVE DISORDER, RECURRENT EPISODE, MODERATE (H): ICD-10-CM

## 2017-03-26 DIAGNOSIS — F41.0 ANXIETY ATTACK: ICD-10-CM

## 2017-03-26 DIAGNOSIS — R42 DIZZINESS: ICD-10-CM

## 2017-03-26 DIAGNOSIS — J34.89 FRONTAL SINUS PAIN: ICD-10-CM

## 2017-03-26 PROCEDURE — 99284 EMERGENCY DEPT VISIT MOD MDM: CPT | Performed by: PHYSICIAN ASSISTANT

## 2017-03-26 PROCEDURE — 99283 EMERGENCY DEPT VISIT LOW MDM: CPT

## 2017-03-26 RX ORDER — MECLIZINE HYDROCHLORIDE 25 MG/1
25 TABLET ORAL EVERY 6 HOURS PRN
Qty: 30 TABLET | Refills: 1 | Status: SHIPPED | OUTPATIENT
Start: 2017-03-26

## 2017-03-26 RX ORDER — ALPRAZOLAM 0.5 MG
0.5 TABLET ORAL 2 TIMES DAILY PRN
Qty: 15 TABLET | Refills: 0 | Status: SHIPPED | OUTPATIENT
Start: 2017-03-26 | End: 2017-04-26

## 2017-03-26 RX ORDER — FLUTICASONE PROPIONATE 50 MCG
1 SPRAY, SUSPENSION (ML) NASAL DAILY
Qty: 1 BOTTLE | Refills: 0 | Status: SHIPPED | OUTPATIENT
Start: 2017-03-26

## 2017-03-26 ASSESSMENT — ENCOUNTER SYMPTOMS
RHINORRHEA: 0
CHILLS: 0
COUGH: 0
BACK PAIN: 0
WEAKNESS: 0
ACTIVITY CHANGE: 0
NUMBNESS: 0
NECK PAIN: 0
TROUBLE SWALLOWING: 0
FEVER: 0
ABDOMINAL PAIN: 0
NAUSEA: 0
SHORTNESS OF BREATH: 0
LIGHT-HEADEDNESS: 0
APPETITE CHANGE: 0
SPEECH DIFFICULTY: 0
BRUISES/BLEEDS EASILY: 0
SINUS PRESSURE: 1
DIZZINESS: 1
PHOTOPHOBIA: 0
VOMITING: 0
HEADACHES: 1
CHEST TIGHTNESS: 0
SORE THROAT: 0
FATIGUE: 0
PALPITATIONS: 0

## 2017-03-26 NOTE — ED AVS SNAPSHOT
[facility]    [address]    [place] [address]    Phone:  [phone]                                       Kina Pond   MRN: [id number]    Department:  [facility]   Date of Visit:  3/26/2017           Patient Information     Date Of Birth          1985        Your diagnoses for this visit were:     Dizziness     Frontal sinus pain     Anxiety attack     Major depressive disorder, recurrent episode, moderate (H)        You were seen by David Millan PA-C.      Follow-up Information     Follow up with Makayla Khoury PA.    Specialty:  Family Practice    Contact information:    [facility]  [address]  [place] [address] [phone]          Follow up with [facility].    Specialty:  EMERGENCY MEDICINE    Why:  If symptoms worsen    Contact information:    [address]  [place] [address] [phone]    Additional information:    From [place]: Take [address]. Turn left at [address]. Turn left at the first stoplight on [address]. At the first stop sign, take a right onto [address]. Take a left into the parking lot and continue through until you reach the North enterance of the building.       From [place]: Take [address]. Take the [address] ramp towards [place]. Turn left onto [address]. Take a slight right onto [address]. Turn left at the first stoplight on [address]. At the first stop sign, take a right onto [address]. Take a left into the parking lot and continue through until you reach the North enterance of the building.       From [place]: Take [address]. Take a right at [address]. At the first stop sign, take a right onto [address]. Take a left into the parking lot and continue through until you reach the North enterance of the building.         Discharge Instructions       Follow-up with Makayla  Peng for recheck.     Return here for ANY headaches, difficulty walking or talking, or for any other concerns.      Hydroxyzine as needed for dizziness.     Xanax as needed for anxiety or dizziness.     Flonase for sinus pressure.     Discharge References/Attachments     DIZZINESS, UNCERTAIN CAUSE (ENGLISH)      Future Appointments        Provider Department Dept Phone Center    5/18/2017 9:00 AM Christine Clifford, [facility] MARIA M DOE [facility] [phone] [facility] M [place]         Review of your medicines      START taking        Dose / Directions Last dose taken    fluticasone 50 MCG/ACT spray   Commonly known as:  FLONASE   Dose:  1 spray   Quantity:  1 Bottle        Spray 1 spray into both nostrils daily   Refills:  0        meclizine 25 MG tablet   Commonly known as:  ANTIVERT   Dose:  25 mg   Quantity:  30 tablet        Take 1 tablet (25 mg) by mouth every 6 hours as needed for dizziness   Refills:  1          CONTINUE these medicines which may have CHANGED, or have new prescriptions. If we are uncertain of the size of tablets/capsules you have at home, strength may be listed as something that might have changed.        Dose / Directions Last dose taken    ALPRAZolam 0.5 MG tablet   Commonly known as:  XANAX   Dose:  0.5 mg   What changed:  See the new instructions.   Quantity:  15 tablet        Take 1 tablet (0.5 mg) by mouth 2 times daily as needed for anxiety   Refills:  0          Our records show that you are taking the medicines listed below. If these are incorrect, please call your family doctor or clinic.        Dose / Directions Last dose taken    buPROPion 150 MG 12 hr tablet   Commonly known as:  WELLBUTRIN SR   Dose:  150 mg   Indication:  pt taking 1 time daily not 2 times daily   Quantity:  60 tablet        Take 1 tablet (150 mg) by mouth 2 times daily   Refills:  1        clindamycin 1 % topical gel   Commonly known as:  CLINDAMAX   Quantity:  60 g        Apply topically 2 times daily   Refills:   11        cyclobenzaprine 10 MG tablet   Commonly known as:  FLEXERIL   Dose:  5-10 mg   Quantity:  30 tablet        Take 0.5-1 tablets (5-10 mg) by mouth 3 times daily as needed for muscle spasms   Refills:  1        escitalopram 5 MG tablet   Commonly known as:  LEXAPRO   Dose:  5 mg   Quantity:  30 tablet        Take 1 tablet (5 mg) by mouth daily   Refills:  11        etonogestrel 68 MG Impl   Commonly known as:  IMPLANON/NEXPLANON   Dose:  1 each        1 each (68 mg) by Subdermal route continuous   Refills:  0        polyethylene glycol powder   Commonly known as:  MIRALAX   Dose:  1 capful   Quantity:  510 g        Take 17 g (1 capful) by mouth daily As needed for irritable bowel sx.  If no bm in 2 day take one scoop   Refills:  1        traZODone 50 MG tablet   Commonly known as:  DESYREL   Quantity:  30 tablet        TAKE 1 TABLET(50 MG) BY MOUTH EVERY NIGHT AS NEEDED FOR SLEEP   Refills:  3                Prescriptions were sent or printed at these locations (3 Prescriptions)                   [facility] [address] [place] [address], [place] - [address] AT [facility] [address]   [address], [facility] [address]    Telephone:  [phone]   Fax:  [phone]   Hours:                  E-Prescribed (2 of 3)         meclizine (ANTIVERT) 25 MG tablet               fluticasone (FLONASE) 50 MCG/ACT spray                 Printed at Department/Unit printer (1 of 3)         ALPRAZolam (XANAX) 0.5 MG tablet                Orders Needing Specimen Collection     None      Pending Results     No orders found from 3/24/2017 to 3/27/2017.            Pending Culture Results     No orders found from 3/24/2017 to 3/27/2017.            Thank you for choosing [facility]       Thank you for choosing [facility] for your care. Our goal is always to provide you with excellent care. Hearing back from our patients is one way we can continue to improve our services. Please take a few minutes to complete the written survey that you  "may receive in the mail after you visit with us. Thank you!        [email or web address] Information     Qminder lets you send messages to your doctor, view your test results, renew your prescriptions, schedule appointments and more. To sign up, go to [email or web address] . Click on \"Log in\" on the left side of the screen, which will take you to the Welcome page. Then click on \"Sign up Now\" on the right side of the page.     You will be asked to enter the access code listed below, as well as some personal information. Please follow the directions to create your username and password.     Your access code is: [email or web address]  Expires: 2017  6:33 PM     Your access code will  in 90 days. If you need help or a new code, please call your [facility] clinic or [phone].        Care EveryWhere ID     This is your Care EveryWhere ID. This could be used by other organizations to access your [facility] medical records  [id number]        After Visit Summary       This is your record. Keep this with you and show to your community pharmacist(s) and doctor(s) at your next visit.                  "

## 2017-03-26 NOTE — ED NOTES
Pt ambulatory to 3; steady on feet. Pt reports that she has had a history of vertigo, in which she took dramimine for in the past. This hasn't worked, currently dizzy for 3-4 days now. Nausea, no vomiting. Dizzy when she closes her eyes.

## 2017-03-26 NOTE — ED AVS SNAPSHOT
[facility]    [address] [place] [address]    Phone:  [phone]                                       Kina Pond   MRN: [id number]    Department:  [facility]   Date of Visit:  3/26/2017           After Visit Summary Signature Page     I have received my discharge instructions, and my questions have been answered. I have discussed any challenges I see with this plan with the nurse or doctor.    ..........................................................................................................................................  Patient/Patient Representative Signature      ..........................................................................................................................................  Patient Representative Print Name and Relationship to Patient    ..................................................               ................................................  Date                                            Time    ..........................................................................................................................................  Reviewed by Signature/Title    ...................................................              ..............................................  Date                                                            Time

## 2017-03-26 NOTE — DISCHARGE INSTRUCTIONS
Follow-up with Makayla Khoury for recheck.     Return here for ANY headaches, difficulty walking or talking, or for any other concerns.      Hydroxyzine as needed for dizziness.     Xanax as needed for anxiety or dizziness.     Flonase for sinus pressure.

## 2017-03-27 RX ORDER — BUPROPION HYDROCHLORIDE 150 MG/1
TABLET, EXTENDED RELEASE ORAL
Qty: 60 TABLET | Refills: 3 | Status: SHIPPED | OUTPATIENT
Start: 2017-03-27 | End: 2017-05-04

## 2017-04-06 ENCOUNTER — TELEPHONE (OUTPATIENT)
Dept: BEHAVIORAL HEALTH | Facility: OTHER | Age: 32
End: 2017-04-06

## 2017-04-06 NOTE — TELEPHONE ENCOUNTER
"[facility]  @FLOW([id number])@      Social Work Care Navigator Note      Patient: Kina Pond  Date: April 6, 2017  Preferred Name: Kina    Previous PHQ-9:   PHQ-9 SCORE 11/10/2016 12/1/2016 1/18/2017   Total Score - - -   Total Score 16 9 14     Previous DANIEL-7:   DANIEL-7 SCORE 11/10/2016 12/1/2016 1/18/2017   Total Score 20 17 20     ACE LEVEL:  ACE Score (Last Two) 11/10/2016 12/1/2016   ACE Raw Score 28 32   Activation Score 50 62.6   ACE Level 2 3       Preferred Contact: @JACOB([id number]@  Type of Contact Today: Phone call (patient reached)      Data: (subjective / Objective):  Patient Goals Areas: @FLOW([id number]@  Patient Stated Goals: @FLOW([id number])@  Recent ED/IP Admission or Discharge?   [facility] ED Admission date: 03/26/17  Objectives Addressed Today:  Patient quit taking Trazadone and Wellbutrin \"Because I was having too many side effects from them\" Feeling dizzy, bruising easily.    Current Stressors / Issues:  See above, and she feels \"less anxious\" she describes feelings are blunted in general, with less motivation.    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  MAke appt with PCP for medication review and me. May need MTM review.    Plan: (Homework, other):   Patient was encouraged to continue to seek condition-related information and education.      Scheduled a Clinic follow up appointment with [facility] in 1 week     Patient has set self-identified goals and will monitor progress until the next appointment on: 04/14/17.     Shannan Hirsch, Social Work Care Coordinator               "

## 2017-04-14 ENCOUNTER — OFFICE VISIT (OUTPATIENT)
Dept: FAMILY MEDICINE | Facility: OTHER | Age: 32
End: 2017-04-14
Payer: COMMERCIAL

## 2017-04-14 ENCOUNTER — OFFICE VISIT (OUTPATIENT)
Dept: BEHAVIORAL HEALTH | Facility: OTHER | Age: 32
End: 2017-04-14
Attending: PHYSICIAN ASSISTANT
Payer: COMMERCIAL

## 2017-04-14 VITALS
SYSTOLIC BLOOD PRESSURE: 110 MMHG | OXYGEN SATURATION: 98 % | HEIGHT: 63 IN | DIASTOLIC BLOOD PRESSURE: 68 MMHG | WEIGHT: 123 LBS | HEART RATE: 88 BPM | TEMPERATURE: 98.2 F | BODY MASS INDEX: 21.79 KG/M2

## 2017-04-14 DIAGNOSIS — F32.A FATIGUE DUE TO DEPRESSION: ICD-10-CM

## 2017-04-14 DIAGNOSIS — F48.9 DEFERRED DIAGNOSIS ON AXIS I: Primary | ICD-10-CM

## 2017-04-14 DIAGNOSIS — R42 VERTIGO: Primary | ICD-10-CM

## 2017-04-14 DIAGNOSIS — F33.1 MODERATE EPISODE OF RECURRENT MAJOR DEPRESSIVE DISORDER (H): ICD-10-CM

## 2017-04-14 DIAGNOSIS — F51.02 TRANSIENT INSOMNIA: ICD-10-CM

## 2017-04-14 DIAGNOSIS — R53.83 FATIGUE DUE TO DEPRESSION: ICD-10-CM

## 2017-04-14 PROCEDURE — 99214 OFFICE O/P EST MOD 30 MIN: CPT | Performed by: PHYSICIAN ASSISTANT

## 2017-04-14 RX ORDER — ESCITALOPRAM OXALATE 10 MG/1
10 TABLET ORAL DAILY
Qty: 30 TABLET | Refills: 1 | Status: SHIPPED | OUTPATIENT
Start: 2017-04-14 | End: 2017-05-04

## 2017-04-14 RX ORDER — TRAZODONE HYDROCHLORIDE 50 MG/1
TABLET, FILM COATED ORAL
Qty: 30 TABLET | Refills: 3 | Status: SHIPPED | OUTPATIENT
Start: 2017-04-14

## 2017-04-14 ASSESSMENT — ANXIETY QUESTIONNAIRES
GAD7 TOTAL SCORE: 15
5. BEING SO RESTLESS THAT IT IS HARD TO SIT STILL: MORE THAN HALF THE DAYS
1. FEELING NERVOUS, ANXIOUS, OR ON EDGE: MORE THAN HALF THE DAYS
2. NOT BEING ABLE TO STOP OR CONTROL WORRYING: MORE THAN HALF THE DAYS
IF YOU CHECKED OFF ANY PROBLEMS ON THIS QUESTIONNAIRE, HOW DIFFICULT HAVE THESE PROBLEMS MADE IT FOR YOU TO DO YOUR WORK, TAKE CARE OF THINGS AT HOME, OR GET ALONG WITH OTHER PEOPLE: VERY DIFFICULT
7. FEELING AFRAID AS IF SOMETHING AWFUL MIGHT HAPPEN: MORE THAN HALF THE DAYS
3. WORRYING TOO MUCH ABOUT DIFFERENT THINGS: NEARLY EVERY DAY
6. BECOMING EASILY ANNOYED OR IRRITABLE: MORE THAN HALF THE DAYS

## 2017-04-14 ASSESSMENT — PAIN SCALES - GENERAL: PAINLEVEL: NO PAIN (0)

## 2017-04-14 ASSESSMENT — PATIENT HEALTH QUESTIONNAIRE - PHQ9: 5. POOR APPETITE OR OVEREATING: MORE THAN HALF THE DAYS

## 2017-04-14 NOTE — PROGRESS NOTES
SUBJECTIVE:                                                    Kina Pond is a 31 year old female who presents to clinic today for the following health issues:          Depression and Anxiety Follow-Up    Status since last visit: No change- but has been sleeping a lot more lately     Other associated symptoms:None    Complicating factors: stopped her meds thought they were the problem.     Significant life event: No     Current substance abuse: None    PHQ-9 SCORE 2016   Total Score - - -   Total Score 9 14 14     DANIEL-7 SCORE 2016   Total Score 17 20 15        PHQ-9  English      PHQ-9   Any Language     GAD7       Amount of exercise or physical activity: 4-5 days/week for an average of 30-45 minutes    Problems taking medications regularly: No    Medication side effects: none    Diet: regular (no restrictions)          Problem list and histories reviewed & adjusted, as indicated.  Additional history: as documented    Patient Active Problem List   Diagnosis     Chemical dependency (H)     Major depression     ACP (advance care planning)     Cystic acne     Past Surgical History:   Procedure Laterality Date     BREAST SURGERY  2013    right lumpectomy     C ANESTH,VAGINAL DELIVERY  2012    Pregnancy full-term; 4.00 hr labor ; APGAR:9 (1 min) 9 (5 min)  (10 min)  -1st degree perineal laceration - Pitocin     C ANESTH,VAGINAL DELIVERY      Pregnancy; 12 hr labor ; 40 week 6 lb(s) 12 oz Male     C ANESTH,VAGINAL DELIVERY      Pregnancy; , 34 week 4 lb(s) 12 oz Male; in hospital for 1 week, ruptured membranes, resealed ([place], [place])     C INDUCED ABORTN BY DIL/EVAC      Pregnancy, induced  due to birth defect; unknown sex       CHOLECYSTECTOMY         Social History   Substance Use Topics     Smoking status: Current Every Day Smoker     Packs/day: 0.50     Years: 20.00     Types: Cigarettes     Last attempt to quit:  4/4/2016     Smokeless tobacco: Never Used      Comment: cutting down to 4-5 cig daily      Alcohol use Yes      Comment: rare     Family History   Problem Relation Age of Onset     Neurologic Disorder Mother      ALS     Respiratory Father      COPD     C.A.D. Father      Family H/O      Prostate Cancer Father      Psychotic Disorder Brother      Breast Cancer Sister      Asthma Other          Current Outpatient Prescriptions   Medication Sig Dispense Refill     ALPRAZolam (XANAX) 0.5 MG tablet Take 1 tablet (0.5 mg) by mouth 2 times daily as needed for anxiety 15 tablet 0     meclizine (ANTIVERT) 25 MG tablet Take 1 tablet (25 mg) by mouth every 6 hours as needed for dizziness 30 tablet 1     fluticasone (FLONASE) 50 MCG/ACT spray Spray 1 spray into both nostrils daily 1 Bottle 0     clindamycin (CLINDAMAX) 1 % topical gel Apply topically 2 times daily 60 g 11     escitalopram (LEXAPRO) 5 MG tablet Take 1 tablet (5 mg) by mouth daily 30 tablet 11     cyclobenzaprine (FLEXERIL) 10 MG tablet Take 0.5-1 tablets (5-10 mg) by mouth 3 times daily as needed for muscle spasms 30 tablet 1     etonogestrel (IMPLANON/NEXPLANON) 68 MG IMPL 1 each (68 mg) by Subdermal route continuous       polyethylene glycol (MIRALAX) powder Take 17 g (1 capful) by mouth daily As needed for irritable bowel sx.  If no bm in 2 day take one scoop 510 g 1     buPROPion (WELLBUTRIN SR) 150 MG 12 hr tablet TAKE 1 TABLET BY MOUTH TWICE DAILY (Patient not taking: Reported on 4/14/2017) 60 tablet 3     traZODone (DESYREL) 50 MG tablet TAKE 1 TABLET(50 MG) BY MOUTH EVERY NIGHT AS NEEDED FOR SLEEP (Patient not taking: Reported on 4/14/2017) 30 tablet 3     No Known Allergies  BP Readings from Last 3 Encounters:   04/14/17 110/68   03/26/17 104/82   02/15/17 108/62    Wt Readings from Last 3 Encounters:   04/14/17 123 lb (55.8 kg)   02/15/17 118 lb (53.5 kg)   01/18/17 118 lb (53.5 kg)                    Reviewed and updated as needed this visit by  "clinical staff  Tobacco  Allergies  Meds  Med Hx  Surg Hx  Fam Hx  Soc Hx      Reviewed and updated as needed this visit by Provider         ROS:  Constitutional, neuro, ENT, endocrine, pulmonary, cardiac, gastrointestinal, genitourinary, musculoskeletal, integument and psychiatric systems are negative, except as otherwise noted.    OBJECTIVE:                                                    /68 (BP Location: Left arm, Patient Position: Chair, Cuff Size: Adult Large)  Pulse 88  Temp 98.2  F (36.8  C) (Tympanic)  Ht 5' 3\" (1.6 m)  Wt 123 lb (55.8 kg)  LMP 04/05/2017  SpO2 98%  Breastfeeding? No  BMI 21.79 kg/m2  Body mass index is 21.79 kg/(m^2).  GENERAL APPEARANCE: healthy, alert and no distress  EYES: Eyes grossly normal to inspection, PERRL and conjunctivae and sclerae normal  HENT: ear canals and TM's normal and nose and mouth without ulcers or lesions  NECK: no adenopathy, no asymmetry, masses, or scars and thyroid normal to palpation  RESP: lungs clear to auscultation - no rales, rhonchi or wheezes  CV: regular rates and rhythm, normal S1 S2, no S3 or S4 and no murmur, click or rub  LYMPHATICS: normal ant/post cervical and supraclavicular nodes  ABDOMEN: soft, nontender, without hepatosplenomegaly or masses and bowel sounds normal  MS: extremities normal- no gross deformities noted  SKIN: no suspicious lesions or rashes  NEURO: Normal strength and tone, mentation intact and speech normal  PSYCH: mentation appears normal and affect normal/ she is flat.       Diagnostic test results:  Diagnostic Test Results:  No results found for this or any previous visit (from the past 24 hour(s)).     ASSESSMENT/PLAN:                                                    1. Vertigo  See PT for recommendations and home program.   She has this more on the right than left.  We will make sure her sodium is ok on the Lexapro.  See PT for exercises.  Let us know if not better. Cut up meclozine in half. Too " sedating.   - Basic metabolic panel    2. Fatigue due to depression  Many meds that cause fatigue.  Using less ativan but having trouble passing water.  There are side effect of each  Med that may cause that.    - Basic metabolic panel  - TSH  - CBC with platelets and differential    3. Transient insomnia  She was placed on meclizine and was so very tired.  We did change trazodone to PRN.  She should cut the Meclizine in half.  - traZODone (DESYREL) 50 MG tablet; TAKE 1 TABLET(50 MG) BY MOUTH EVERY NIGHT AS NEEDED FOR SLEEP  Dispense: 30 tablet; Refill: 3    4. Moderate episode of recurrent major depressive disorder (H)  Increase her medication today to 10 mg and see her in one month.   - escitalopram (LEXAPRO) 10 MG tablet; Take 1 tablet (10 mg) by mouth daily  Dispense: 30 tablet; Refill: 1      See Patient Instructions    Makayla Khoury PA-C  [facility]

## 2017-04-14 NOTE — PROGRESS NOTES
"[facility]         Social Work Care Navigator Note      Patient: Kina Pond  Date: April 14, 2017  Preferred Name: Kina    Previous PHQ-9:   PHQ-9 SCORE 12/1/2016 1/18/2017 4/14/2017   Total Score - - -   Total Score 9 14 14     Previous DANIEL-7:   DANIEL-7 SCORE 12/1/2016 1/18/2017 4/14/2017   Total Score 17 20 15     ACE LEVEL:  ACE Score (Last Two) 11/10/2016 12/1/2016   ACE Raw Score 28 32   Activation Score 50 62.6   ACE Level 2 3       Preferred Contact: @[email or web address]([id number]@  Type of Contact Today: Face to Face in Clinic      Data: (subjective / Objective):  Patient Goals Areas: Goal Areas: Mental Health  Patient Stated Goals: Patient stated goals: \" Take my kids to the Easter Egg hunt instead of sleeping\"  I can be outside with my kids 10 min a day.  Recent ED/IP Admission or Discharge?   3/26/17      Objectives Addressed Today:  Not \"feeling sad-just blah, no energy\" I sleep all the time.    Current Stressors / Issues:  Not getting anything done.    Intervention:  Motivational Interviewing: Expressed Empathy/Understanding, Supported Autonomy, Collaboration, Evocation, Permission to raise concern or advise, Open-ended questions, Reflections: simple and complex and Change talk (evoked)   Target Behavior(s): Explored thoughts and readiness to participate in individual therapy    Assessment: (Progress on Goals / Homework): PCP changed medication, long term counseling not until May- made appt with [facility]  Changed goals as she has not been able to to the other stated goal.    Plan: (Homework, other): Come to appt with [facility]  Patient was encouraged to continue to seek condition-related information and education.      Scheduled a Clinic follow up appointment with [facility] in 1 week     Patient has set self-identified goals and will monitor progress until the next appointment on: 4/19/17.     Shannan Hirsch, Social Work Care Coordinator               Next 5 appointments (look out 90 days)     Apr 19, 2017  " 9:00 AM CDT   (Arrive by 8:45 AM)   Return Visit with Pamela Peters, [facility]    [address] [place] [id number] [phone]

## 2017-04-14 NOTE — MR AVS SNAPSHOT
"              After Visit Summary   4/14/2017    Kina Pond    MRN: [id number]           Patient Information     Date Of Birth          1985        Visit Information        Provider Department      4/14/2017 10:00 AM Shannan Hirsch [facility]        Today's Diagnoses     Deferred diagnosis on axis I    -  1       Follow-ups after your visit        Your next 10 appointments already scheduled     Apr 19, 2017  9:00 AM CDT   (Arrive by 8:45 AM)   Return Visit with Pamela Peters, [facility]    [address] [place] [id number] [phone]            May 18, 2017  9:00 AM CDT   (Arrive by 8:45 AM)   New Visit with Christine Clifford, [facility] ([facility]    [address] [place] [id number] [phone]              Who to contact     If you have questions or need follow up information about today's clinic visit or your schedule please contact [facility] directly at [phone].  Normal or non-critical lab and imaging results will be communicated to you by [employer], letter or phone within 4 business days after the clinic has received the results. If you do not hear from us within 7 days, please contact the clinic through [employer] or phone. If you have a critical or abnormal lab result, we will notify you by phone as soon as possible.  Submit refill requests through [email or web address] or call your pharmacy and they will forward the refill request to us. Please allow 3 business days for your refill to be completed.          Additional Information About Your Visit        MyChart Information     [email or web address] lets you send messages to your doctor, view your test results, renew your prescriptions, schedule appointments and more. To sign up, go to [email or web address] . Click on \"Log in\" on the left side of the screen, which will take you to the Welcome page. Then click on \"Sign up Now\" on the right " side of the page.     You will be asked to enter the access code listed below, as well as some personal information. Please follow the directions to create your username and password.     Your access code is: [email or web address]  Expires: 2017  6:33 PM     Your access code will  in 90 days. If you need help or a new code, please call your [facility] or [phone].        Care EveryWhere ID     This is your Care EveryWhere ID. This could be used by other organizations to access your [place] medical records  [id number]        Your Vitals Were     Last Period                   2017            Blood Pressure from Last 3 Encounters:   17 110/68   17 104/82   02/15/17 108/62    Weight from Last 3 Encounters:   17 123 lb (55.8 kg)   02/15/17 118 lb (53.5 kg)   17 118 lb (53.5 kg)              Today, you had the following     No orders found for display         Today's Medication Changes          These changes are accurate as of: 17 10:39 AM.  If you have any questions, ask your nurse or doctor.               These medicines have changed or have updated prescriptions.        Dose/Directions    escitalopram 10 MG tablet   Commonly known as:  LEXAPRO   This may have changed:    - medication strength  - how much to take   Used for:  Moderate episode of recurrent major depressive disorder (H)   Changed by:  Makayla Khoury PA        Dose:  10 mg   Take 1 tablet (10 mg) by mouth daily   Quantity:  30 tablet   Refills:  1            Where to get your medicines      These medications were [facility] to [id number] - [place] [address] -  [address] AT [facility] [address], [place] [address]     Phone:  [phone]     escitalopram 10 MG tablet    traZODone 50 MG tablet                Primary Care Provider Office Phone # Fax #    DAYANNA Goodman [phone]       [facility] [address] [address]  [place] [address]         Thank you!     Thank you for choosing [facility]  for your care. Our goal is always to provide you with excellent care. Hearing back from our patients is one way we can continue to improve our services. Please take a few minutes to complete the written survey that you may receive in the mail after your visit with us. Thank you!             Your Updated Medication List - Protect others around you: Learn how to safely use, store and throw away your medicines at www.disposemymeds.org.          This list is accurate as of: 4/14/17 10:39 AM.  Always use your most recent med list.                   Brand Name Dispense Instructions for use    ALPRAZolam 0.5 MG tablet    XANAX    15 tablet    Take 1 tablet (0.5 mg) by mouth 2 times daily as needed for anxiety       buPROPion 150 MG 12 hr tablet    WELLBUTRIN SR    60 tablet    TAKE 1 TABLET BY MOUTH TWICE DAILY       clindamycin 1 % topical gel    CLINDAMAX    60 g    Apply topically 2 times daily       cyclobenzaprine 10 MG tablet    FLEXERIL    30 tablet    Take 0.5-1 tablets (5-10 mg) by mouth 3 times daily as needed for muscle spasms       escitalopram 10 MG tablet    LEXAPRO    30 tablet    Take 1 tablet (10 mg) by mouth daily       etonogestrel 68 MG Impl    IMPLANON/NEXPLANON     1 each (68 mg) by Subdermal route continuous       fluticasone 50 MCG/ACT spray    FLONASE    1 Bottle    Spray 1 spray into both nostrils daily       meclizine 25 MG tablet    ANTIVERT    30 tablet    Take 1 tablet (25 mg) by mouth every 6 hours as needed for dizziness       polyethylene glycol powder    MIRALAX    510 g    Take 17 g (1 capful) by mouth daily As needed for irritable bowel sx.  If no bm in 2 day take one scoop       traZODone 50 MG tablet    DESYREL    30 tablet    TAKE 1 TABLET(50 MG) BY MOUTH EVERY NIGHT AS NEEDED FOR SLEEP

## 2017-04-14 NOTE — PATIENT INSTRUCTIONS
Positional Vertigo          What is positional vertigo?   Positional vertigo is an inner ear problem. It causes brief but sometimes severe feelings of spinning. Some people feel that their head or body is spinning. Others feel the room is spinning. People often say they are dizzy, but dizzy is a very general term. Vertigo, on the other hand, is the very specific feeling of uncontrollable spinning.   Symptoms of positional vertigo happen suddenly when you change the position of your head.   How does it occur?   In the inner part of your ear are 3 semicircular canals. Movement of the fluid in these canals helps your brain maintain your balance and know what position you are in (for example, standing up, lying down, or standing on your head).   Sometimes small crystals of calcium develop and float in the fluid in the inner ear. This can happen after a head injury, with a severe cold, or simply as a part of normal aging. The crystals can cause vertigo when you change head position and they strike against nerve endings in the semicircular canals. Usually the calcium crystals dissolve in a few weeks and stop causing vertigo. However, sometimes the crystals do not dissolve and the vertigo returns from time to time.   What are the symptoms?   A sudden feeling that you are spinning, or that the room is spinning, is the main symptom. You may feel the vertigo when you first wake up. It may seem that any turn of your head brings on brief but intense spells of vertigo. It may happen when you tilt your head, look up or down, or roll over in bed.   You may have nausea and vomiting along with the vertigo. Even if a spell of vertigo is brief, you may have a feeling of queasiness for several minutes or even hours afterward.   How is it diagnosed?   Your healthcare provider will ask about your symptoms and examine you. You may also be given a Brandan-Hallpike position test.   You start the [place]-Hallpike test by sitting upright  on the examining table. Your healthcare provider slowly brings your head down over the edge of the table and turns your head to one side. If you have positional vertigo, your provider will see your eyes making fast, jerky movements called nystagmus. If no nystagmus is seen, your provider will repeat the test, this time turning your head to the opposite side, to test the other inner ear. If you then have nystagmus and vertigo, the ear that is pointing toward the floor is the one causing the problem. The nystagmus and vertigo will slow down and stop after 15 to 20 seconds. If you do not move your head, no more symptoms will occur. When you sit back up, you will have vertigo again, but for a shorter time.   Other tests you may have are:   an ear exam   an audiogram to check your hearing   a test of your nerve responses   an electronystagmogram (ENG) test.   How is it treated?   Mild vertigo is often treated with medicine. The most common medicine for this problem is meclizine. It is taken up to 4 times a day for the vertigo and nausea or vomiting. One of the problems with this medicine is that it causes drowsiness. This is not as much of a problem if you have severe vertigo, which usually requires bed rest. Then the medicine can help you sleep and get relief from the vertigo while you sleep.   Your healthcare provider may recommend techniques that use gravity to move the crystals away from the nerve endings into an area of the inner ear that won't cause any problems. These are called repositioning techniques.   One repositioning technique is the Epley maneuver. It can be very helpful. Your healthcare provider will move your head into 4 positions. You will hold each position for about 30 seconds.   Your healthcare provider may also suggest that you do Bethea-Daroff exercises. Your provider may recommend that you do these exercises 3 times a day for 2 weeks. To do these exercises:   Start by sitting upright on your bed.    Lie on your left side, with your head angled upward about jail. (Imagine that you are looking at the head of someone standing about 6 feet in front of you.) Stay in this position for 30 seconds, or, if you are having vertigo, until the vertigo stops.   Return to the sitting position for 30 seconds.   Lie on your right side, and follow the same routine.   Your healthcare provider may refer you to a physical therapist to learn and practice these repositioning techniques.   Rarely, when repositioning techniques don't help and the vertigo has not gone away after a few weeks, severe cases may eventually require surgery.   How long will the effects last?   Even without treatment, positional vertigo usually goes away within several weeks. Sometimes it recurs despite treatment.   How do I take care of myself?   If your vertigo is mild, you may be able to continue your usual activities, especially if you have opportunities to sit and rest when you have vertigo.   If your vertigo does not allow you to continue your usual routine, you should rest at home.   Use medicine as prescribed by your healthcare provider to help stop symptoms of dizziness, nausea, and vomiting.   Follow your instructions for using the repositioning techniques.   Do not try to drive, operate tools or machinery, or do other tasks, even cooking, that could endanger yourself or others if you suddenly become dizzy.   Follow your healthcare provider's recommendations for follow-up visits.   Contact your healthcare provider if:   Your symptoms seem to be getting worse, more frequent, or longer lasting.   You develop new symptoms, such as a loss of hearing or severe headache.     Published by [employer].  This content is reviewed periodically and is subject to change as new health information becomes available. The information is intended to inform and educate and is not a replacement for medical evaluation, advice, diagnosis or treatment by a healthcare  professional.   Developed by [employer].   ? 2010 [employer] and/or its affiliates. All Rights Reserved.   Copyright   [employer] 2011  Adult Health Advisor

## 2017-04-14 NOTE — LETTER
"[facility]al [facility] Home ([facility]): Health Action Plan     [facility]      Name: Kina NGUYEN Pond  Preferred Name: Kina  : 1985  MRN: [id number]    How to Contact me          Home Phone [phone]   Mobile [phone]     Ok to contact me by email?* No   *Signed & Scanned Consent form Required*   No e-mail address on record       My Goals   Goal Areas: Mental Health   Patient stated goals: \" Take my kids to the Easter Egg hunt instead of sleeping\"  I can be outside with my kids 10 min a day.   Strengths related to each goal: taking vitamins, trying new exercises   Services and Supports Needed: call and let us know when she has accomplished these tasks   Activities / Actions of Team to support goal(s): Help with cheerleeding   Activities / Actions of Patient / Parent / Guardian to support goal(s): Staying up off couch for 30 min less a day    Recommended Referral       My Team Members and Their Contact Information  Patient Care Team       Relationship Specialty Notifications Start End    Makayla Khoury PA PCP - General   5/15/13     Phone: [phone] Fax: [phone]         [facility] [address] [place] [address]    Shannan Hirsch   Abnormal results only, Admissions 16     Pamela Peters, [facility] Behavioral Health Clinician Licensed Mental Health Abnormal results only, Admissions 16     Phone: [phone]          [facility] [address] [place] [address]          My Wellness Plan                Kina WENDY Pond co-developed the Health Action Plan with the [facility] Team and received a copy of this document.  Date Health Action Plan Completed/Updated: 17                "

## 2017-04-14 NOTE — NURSING NOTE
"Chief Complaint   Patient presents with     Anxiety     Depression       Initial /68 (BP Location: Left arm, Patient Position: Chair, Cuff Size: Adult Large)  Pulse 88  Temp 98.2  F (36.8  C) (Tympanic)  Ht 5' 3\" (1.6 m)  Wt 123 lb (55.8 kg)  LMP 04/05/2017  SpO2 98%  Breastfeeding? No  BMI 21.79 kg/m2 Estimated body mass index is 21.79 kg/(m^2) as calculated from the following:    Height as of this encounter: 5' 3\" (1.6 m).    Weight as of this encounter: 123 lb (55.8 kg).  Medication Reconciliation: complete   Claribel Mendez CMA(AAMA)   "

## 2017-04-14 NOTE — MR AVS SNAPSHOT
After Visit Summary   4/14/2017    Kina Pond    MRN: [id number]           Patient Information     Date Of Birth          1985        Visit Information        Provider Department      4/14/2017 9:15 AM Makayla Khoury PA [facility]        Today's Diagnoses     Vertigo    -  1    Fatigue due to depression        Transient insomnia        Moderate episode of recurrent major depressive disorder (H)          Care Instructions                Positional Vertigo          What is positional vertigo?   Positional vertigo is an inner ear problem. It causes brief but sometimes severe feelings of spinning. Some people feel that their head or body is spinning. Others feel the room is spinning. People often say they are dizzy, but dizzy is a very general term. Vertigo, on the other hand, is the very specific feeling of uncontrollable spinning.   Symptoms of positional vertigo happen suddenly when you change the position of your head.   How does it occur?   In the inner part of your ear are 3 semicircular canals. Movement of the fluid in these canals helps your brain maintain your balance and know what position you are in (for example, standing up, lying down, or standing on your head).   Sometimes small crystals of calcium develop and float in the fluid in the inner ear. This can happen after a head injury, with a severe cold, or simply as a part of normal aging. The crystals can cause vertigo when you change head position and they strike against nerve endings in the semicircular canals. Usually the calcium crystals dissolve in a few weeks and stop causing vertigo. However, sometimes the crystals do not dissolve and the vertigo returns from time to time.   What are the symptoms?   A sudden feeling that you are spinning, or that the room is spinning, is the main symptom. You may feel the vertigo when you first wake up. It may seem that any turn of your head brings on brief but intense  spells of vertigo. It may happen when you tilt your head, look up or down, or roll over in bed.   You may have nausea and vomiting along with the vertigo. Even if a spell of vertigo is brief, you may have a feeling of queasiness for several minutes or even hours afterward.   How is it diagnosed?   Your healthcare provider will ask about your symptoms and examine you. You may also be given a [place]-Hallpike position test.   You start the [place] test by sitting upright on the examining table. Your healthcare provider slowly brings your head down over the edge of the table and turns your head to one side. If you have positional vertigo, your provider will see your eyes making fast, jerky movements called nystagmus. If no nystagmus is seen, your provider will repeat the test, this time turning your head to the opposite side, to test the other inner ear. If you then have nystagmus and vertigo, the ear that is pointing toward the floor is the one causing the problem. The nystagmus and vertigo will slow down and stop after 15 to 20 seconds. If you do not move your head, no more symptoms will occur. When you sit back up, you will have vertigo again, but for a shorter time.   Other tests you may have are:   an ear exam   an audiogram to check your hearing   a test of your nerve responses   an electronystagmogram (ENG) test.   How is it treated?   Mild vertigo is often treated with medicine. The most common medicine for this problem is meclizine. It is taken up to 4 times a day for the vertigo and nausea or vomiting. One of the problems with this medicine is that it causes drowsiness. This is not as much of a problem if you have severe vertigo, which usually requires bed rest. Then the medicine can help you sleep and get relief from the vertigo while you sleep.   Your healthcare provider may recommend techniques that use gravity to move the crystals away from the nerve endings into an area of the inner ear that won't cause  any problems. These are called repositioning techniques.   One repositioning technique is the Epley maneuver. It can be very helpful. Your healthcare provider will move your head into 4 positions. You will hold each position for about 30 seconds.   Your healthcare provider may also suggest that you do Bet[place]-Daroff exercises. Your provider may recommend that you do these exercises 3 times a day for 2 weeks. To do these exercises:   Start by sitting upright on your bed.   Lie on your left side, with your head angled upward about senior care. (Imagine that you are looking at the head of someone standing about 6 feet in front of you.) Stay in this position for 30 seconds, or, if you are having vertigo, until the vertigo stops.   Return to the sitting position for 30 seconds.   Lie on your right side, and follow the same routine.   Your healthcare provider may refer you to a physical therapist to learn and practice these repositioning techniques.   Rarely, when repositioning techniques don't help and the vertigo has not gone away after a few weeks, severe cases may eventually require surgery.   How long will the effects last?   Even without treatment, positional vertigo usually goes away within several weeks. Sometimes it recurs despite treatment.   How do I take care of myself?   If your vertigo is mild, you may be able to continue your usual activities, especially if you have opportunities to sit and rest when you have vertigo.   If your vertigo does not allow you to continue your usual routine, you should rest at home.   Use medicine as prescribed by your healthcare provider to help stop symptoms of dizziness, nausea, and vomiting.   Follow your instructions for using the repositioning techniques.   Do not try to drive, operate tools or machinery, or do other tasks, even cooking, that could endanger yourself or others if you suddenly become dizzy.   Follow your healthcare provider's recommendations for follow-up visits.    Contact your healthcare provider if:   Your symptoms seem to be getting worse, more frequent, or longer lasting.   You develop new symptoms, such as a loss of hearing or severe headache.     Published by [employer].  This content is reviewed periodically and is subject to change as new health information becomes available. The information is intended to inform and educate and is not a replacement for medical evaluation, advice, diagnosis or treatment by a healthcare professional.   Developed by [employer].   ? 2010 [facility] and/or its affiliates. All Rights Reserved.   Copyright   Clinical Reference Systems 2011  Adult Health Advisor               Follow-ups after your visit        Additional Services     PHYSICAL THERAPY REFERRAL       *This therapy referral will be filtered to a centralized scheduling office at [facility] and the patient will receive a call to schedule an appointment at a [facility] location most convenient for them. *     [facility] provides Physical Therapy evaluation and treatment and many specialty services across the [facility].  If requesting a specialty program, please choose from the list below.    If you have not heard from the scheduling office within 2 business days, please call [phone] for all locations, with the exception of [place], please call [phone].  Treatment: Evaluation & Treatment  Special Instructions/Modalities:   Special Programs:     Please be aware that coverage of these services is subject to the terms and limitations of your health insurance plan.  Call member services at your health plan with any benefit or coverage questions.      **Note to Provider:  If you are referring outside of [facility] for the therapy appointment, please list the name of the location in the  special instructions  above, print the referral and give to the patient to schedule the appointment.                  Your next 10 appointments  "already scheduled     May 18, 2017  9:00 AM CDT   (Arrive by 8:45 AM)   New Visit with Christine Clifford [facility]   [facility] [place] [facility]    [address] [place] [address]  [place] [address] [phone]              Who to contact     If you have questions or need follow up information about today's clinic visit or your schedule please contact [facility] directly at [phone].  Normal or non-critical lab and imaging results will be communicated to you by [employer], letter or phone within 4 business days after the clinic has received the results. If you do not hear from us within 7 days, please contact the clinic through [employer] or phone. If you have a critical or abnormal lab result, we will notify you by phone as soon as possible.  Submit refill requests through [employer] or call your pharmacy and they will forward the refill request to us. Please allow 3 business days for your refill to be completed.          Additional Information About Your Visit        [employer] Information     [employer] lets you send messages to your doctor, view your test results, renew your prescriptions, schedule appointments and more. To sign up, go to [email or web address] . Click on \"Log in\" on the left side of the screen, which will take you to the Welcome page. Then click on \"Sign up Now\" on the right side of the page.     You will be asked to enter the access code listed below, as well as some personal information. Please follow the directions to create your username and password.     Your access code is: [email or web address]  Expires: 2017  6:33 PM     Your access code will  in 90 days. If you need help or a new code, please call your [facility] clinic or [phone].        Care EveryWhere ID     This is your Care EveryWhere ID. This could be used by other organizations to access your [facility] medical records  [id number]        Your Vitals Were     Pulse Temperature Height Last Period Pulse Oximetry " "Breastfeeding?    88 98.2  F (36.8  C) (Tympanic) 5' 3\" (1.6 m) 04/05/2017 98% No    BMI (Body Mass Index)                   21.79 kg/m2            Blood Pressure from Last 3 Encounters:   04/14/17 110/68   03/26/17 104/82   02/15/17 108/62    Weight from Last 3 Encounters:   04/14/17 123 lb (55.8 kg)   02/15/17 118 lb (53.5 kg)   01/18/17 118 lb (53.5 kg)              We Performed the Following     Basic metabolic panel     CBC with platelets and differential     PHYSICAL THERAPY REFERRAL     TSH          Today's Medication Changes          These changes are accurate as of: 4/14/17 10:03 AM.  If you have any questions, ask your nurse or doctor.               These medicines have changed or have updated prescriptions.        Dose/Directions    escitalopram 10 MG tablet   Commonly known as:  LEXAPRO   This may have changed:    - medication strength  - how much to take   Used for:  Moderate episode of recurrent major depressive disorder (H)   Changed by:  Makayla Khoury PA        Dose:  10 mg   Take 1 tablet (10 mg) by mouth daily   Quantity:  30 tablet   Refills:  1            Where to get your medicines      [id number] [place] - [address] AT [facility] [address], [place] [address]     Phone:  [phone]     escitalopram 10 MG tablet    traZODone 50 MG tablet                Primary Care Provider Office Phone # Fax #    DAYANNA Goodman [phone]       [address] [address]        Thank you!     Thank you for choosing [facility]  for your care. Our goal is always to provide you with excellent care. Hearing back from our patients is one way we can continue to improve our services. Please take a few minutes to complete the written survey that you may receive in the mail after your visit with us. Thank you!             Your Updated Medication List - Protect " others around you: Learn how to safely use, store and throw away your medicines at www.disposemymeds.org.          This list is accurate as of: 4/14/17 10:03 AM.  Always use your most recent med list.                   Brand Name Dispense Instructions for use    ALPRAZolam 0.5 MG tablet    XANAX    15 tablet    Take 1 tablet (0.5 mg) by mouth 2 times daily as needed for anxiety       buPROPion 150 MG 12 hr tablet    WELLBUTRIN SR    60 tablet    TAKE 1 TABLET BY MOUTH TWICE DAILY       clindamycin 1 % topical gel    CLINDAMAX    60 g    Apply topically 2 times daily       cyclobenzaprine 10 MG tablet    FLEXERIL    30 tablet    Take 0.5-1 tablets (5-10 mg) by mouth 3 times daily as needed for muscle spasms       escitalopram 10 MG tablet    LEXAPRO    30 tablet    Take 1 tablet (10 mg) by mouth daily       etonogestrel 68 MG Impl    IMPLANON/NEXPLANON     1 each (68 mg) by Subdermal route continuous       fluticasone 50 MCG/ACT spray    FLONASE    1 Bottle    Spray 1 spray into both nostrils daily       meclizine 25 MG tablet    ANTIVERT    30 tablet    Take 1 tablet (25 mg) by mouth every 6 hours as needed for dizziness       polyethylene glycol powder    MIRALAX    510 g    Take 17 g (1 capful) by mouth daily As needed for irritable bowel sx.  If no bm in 2 day take one scoop       traZODone 50 MG tablet    DESYREL    30 tablet    TAKE 1 TABLET(50 MG) BY MOUTH EVERY NIGHT AS NEEDED FOR SLEEP

## 2017-04-15 ASSESSMENT — PATIENT HEALTH QUESTIONNAIRE - PHQ9: SUM OF ALL RESPONSES TO PHQ QUESTIONS 1-9: 14

## 2017-04-15 ASSESSMENT — ANXIETY QUESTIONNAIRES: GAD7 TOTAL SCORE: 15

## 2017-04-17 DIAGNOSIS — H81.13 BPPV (BENIGN PAROXYSMAL POSITIONAL VERTIGO), BILATERAL: Primary | ICD-10-CM

## 2017-04-17 DIAGNOSIS — R42 VERTIGO: ICD-10-CM

## 2017-04-17 DIAGNOSIS — F32.A FATIGUE DUE TO DEPRESSION: ICD-10-CM

## 2017-04-17 DIAGNOSIS — R53.83 FATIGUE DUE TO DEPRESSION: ICD-10-CM

## 2017-04-17 LAB
ANION GAP SERPL CALCULATED.3IONS-SCNC: 10 MMOL/L (ref 3–14)
BASOPHILS # BLD AUTO: 0 10E9/L (ref 0–0.2)
BASOPHILS NFR BLD AUTO: 0.4 %
BUN SERPL-MCNC: 11 MG/DL (ref 7–30)
CALCIUM SERPL-MCNC: 8.1 MG/DL (ref 8.5–10.1)
CHLORIDE SERPL-SCNC: 105 MMOL/L (ref 94–109)
CO2 SERPL-SCNC: 24 MMOL/L (ref 20–32)
CREAT SERPL-MCNC: 0.75 MG/DL (ref 0.52–1.04)
DIFFERENTIAL METHOD BLD: ABNORMAL
EOSINOPHIL # BLD AUTO: 0.2 10E9/L (ref 0–0.7)
EOSINOPHIL NFR BLD AUTO: 3.1 %
ERYTHROCYTE [DISTWIDTH] IN BLOOD BY AUTOMATED COUNT: 11.5 % (ref 10–15)
GFR SERPL CREATININE-BSD FRML MDRD: 90 ML/MIN/1.7M2
GLUCOSE SERPL-MCNC: 87 MG/DL (ref 70–99)
HCT VFR BLD AUTO: 39.1 % (ref 35–47)
HGB BLD-MCNC: 13.8 G/DL (ref 11.7–15.7)
IMM GRANULOCYTES # BLD: 0 10E9/L (ref 0–0.4)
IMM GRANULOCYTES NFR BLD: 0.1 %
LYMPHOCYTES # BLD AUTO: 2.4 10E9/L (ref 0.8–5.3)
LYMPHOCYTES NFR BLD AUTO: 34.4 %
MCH RBC QN AUTO: 31.4 PG (ref 26.5–33)
MCHC RBC AUTO-ENTMCNC: 35.3 G/DL (ref 31.5–36.5)
MCV RBC AUTO: 89 FL (ref 78–100)
MONOCYTES # BLD AUTO: 0.4 10E9/L (ref 0–1.3)
MONOCYTES NFR BLD AUTO: 6.3 %
NEUTROPHILS # BLD AUTO: 3.9 10E9/L (ref 1.6–8.3)
NEUTROPHILS NFR BLD AUTO: 55.7 %
NRBC # BLD AUTO: 0 10*3/UL
NRBC BLD AUTO-RTO: 0 /100
PLATELET # BLD AUTO: 451 10E9/L (ref 150–450)
POTASSIUM SERPL-SCNC: 3.8 MMOL/L (ref 3.4–5.3)
RBC # BLD AUTO: 4.4 10E12/L (ref 3.8–5.2)
SODIUM SERPL-SCNC: 139 MMOL/L (ref 133–144)
TSH SERPL DL<=0.05 MIU/L-ACNC: 1.47 MU/L (ref 0.4–4)
WBC # BLD AUTO: 7 10E9/L (ref 4–11)

## 2017-04-17 PROCEDURE — 80048 BASIC METABOLIC PNL TOTAL CA: CPT | Performed by: PHYSICIAN ASSISTANT

## 2017-04-17 PROCEDURE — 85025 COMPLETE CBC W/AUTO DIFF WBC: CPT | Performed by: PHYSICIAN ASSISTANT

## 2017-04-17 PROCEDURE — 84443 ASSAY THYROID STIM HORMONE: CPT | Performed by: PHYSICIAN ASSISTANT

## 2017-04-17 PROCEDURE — 36415 COLL VENOUS BLD VENIPUNCTURE: CPT | Performed by: PHYSICIAN ASSISTANT

## 2017-04-25 ENCOUNTER — TELEPHONE (OUTPATIENT)
Dept: BEHAVIORAL HEALTH | Facility: OTHER | Age: 32
End: 2017-04-25

## 2017-04-26 DIAGNOSIS — F33.1 MAJOR DEPRESSIVE DISORDER, RECURRENT EPISODE, MODERATE (H): ICD-10-CM

## 2017-04-26 DIAGNOSIS — F41.0 ANXIETY ATTACK: ICD-10-CM

## 2017-04-26 RX ORDER — ALPRAZOLAM 0.5 MG
0.5 TABLET ORAL 2 TIMES DAILY PRN
Qty: 15 TABLET | Refills: 0 | Status: SHIPPED | OUTPATIENT
Start: 2017-04-26 | End: 2017-05-01

## 2017-04-26 NOTE — TELEPHONE ENCOUNTER
xanax      Last Written Prescription Date: 3/26/17  Last Fill Quantity: 15,  # refills: 0   Last Office Visit with [facility], [facility] or [facility] prescribing provider: 4/14/17                                         Next 5 appointments (look out 90 days)     May 04, 2017  9:00 AM CDT   (Arrive by 8:45 AM)   Return Visit with Pamela Peters, [facility]    [address] [place] [address] [phone]

## 2017-05-01 ENCOUNTER — TELEPHONE (OUTPATIENT)
Dept: BEHAVIORAL HEALTH | Facility: OTHER | Age: 32
End: 2017-05-01

## 2017-05-01 DIAGNOSIS — F41.0 ANXIETY ATTACK: ICD-10-CM

## 2017-05-01 DIAGNOSIS — F33.1 MAJOR DEPRESSIVE DISORDER, RECURRENT EPISODE, MODERATE (H): ICD-10-CM

## 2017-05-01 RX ORDER — ALPRAZOLAM 0.5 MG
0.5 TABLET ORAL 2 TIMES DAILY PRN
Qty: 15 TABLET | Refills: 0 | Status: CANCELLED | OUTPATIENT
Start: 2017-05-01

## 2017-05-01 RX ORDER — ALPRAZOLAM 0.5 MG
0.5 TABLET ORAL 2 TIMES DAILY PRN
Qty: 15 TABLET | Refills: 0 | Status: SHIPPED | OUTPATIENT
Start: 2017-05-01

## 2017-05-01 NOTE — TELEPHONE ENCOUNTER
[facility]  @FLOW([id number])@      Social Work Care Navigator Note      Patient: Kina Pond  Date: May 1, 2017  Preferred Name: Kina    Previous PHQ-9:   PHQ-9 SCORE 12/1/2016 1/18/2017 4/14/2017   Total Score - - -   Total Score 9 14 14     Previous DANIEL-7:   DANIEL-7 SCORE 12/1/2016 1/18/2017 4/14/2017   Total Score 17 20 15     ACE LEVEL:  ACE Score (Last Two) 11/10/2016 12/1/2016   ACE Raw Score 28 32   Activation Score 50 62.6   ACE Level 2 3       Preferred Contact: @JACOB([id number])@  Type of Contact Today: Phone call (patient / identified key support person reached)      Data: (subjective / Objective):  Patient Goals Areas: @FLOW([id number])@  Patient Stated Goals: @FLOW([id number])@  Recent ED/IP Admission or Discharge?   None  Patient Goals:  [unfilled]    Recent ED/IP Admission or Discharge?   None    Objectives Addressed Today:  Prescriptions not available yet.    Current Stressors / Issues:  Tracked down the issue of a fax machine not working with the pharmacy. Suggest she make an appt with provider to go over meds    Intervention:  Motivational Interviewing: Expressed Empathy/Understanding and Supported Autonomy, Collaboration, Evocation   Target Behavior(s): taking medicines as prescribed    Assessment: (Progress on Goals / Homework):  Getting outside for 10-15 min a day    Plan: (Homework, other):  Patient was encouraged to continue to seek condition-related information and education.      Scheduled a Clinic follow up appointment with [facility] in 1 week     Patient has set self-identified goals and will monitor progress until the next appointment on: 05/04/17.     Shannan Hirsch, Behavioral Health Clinician               Next 5 appointments (look out 90 days)     May 04, 2017  9:00 AM CDT   (Arrive by 8:45 AM)   Return Visit with Pamela Peters [facility] ([facility])    [address] [place] [address] [phone]

## 2017-05-01 NOTE — TELEPHONE ENCOUNTER
Pt called to check status of prescription. Stated pharmacy still has not gotten. Please call her back at [phone]

## 2017-05-01 NOTE — TELEPHONE ENCOUNTER
xanax      Last Written Prescription Date: 4/26/17  Last Fill Quantity: 15,  # refills: 0   Last Office Visit with [facility], [facility] or [facility] prescribing provider: 4/14/17                                         Next 5 appointments (look out 90 days)     May 04, 2017  9:00 AM CDT   (Arrive by 8:45 AM)   Return Visit with Pamela Peters, [facility]    [address] [place] [address] [phone]

## 2017-05-02 ENCOUNTER — OFFICE VISIT (OUTPATIENT)
Dept: PSYCHOLOGY | Facility: OTHER | Age: 32
End: 2017-05-02
Attending: SOCIAL WORKER
Payer: COMMERCIAL

## 2017-05-02 DIAGNOSIS — F41.3 OTHER MIXED ANXIETY DISORDERS: Primary | ICD-10-CM

## 2017-05-02 DIAGNOSIS — F33.1 MODERATE EPISODE OF RECURRENT MAJOR DEPRESSIVE DISORDER (H): ICD-10-CM

## 2017-05-02 PROCEDURE — 90837 PSYTX W PT 60 MINUTES: CPT | Performed by: SOCIAL WORKER

## 2017-05-02 ASSESSMENT — ANXIETY QUESTIONNAIRES
5. BEING SO RESTLESS THAT IT IS HARD TO SIT STILL: SEVERAL DAYS
GAD7 TOTAL SCORE: 18
IF YOU CHECKED OFF ANY PROBLEMS ON THIS QUESTIONNAIRE, HOW DIFFICULT HAVE THESE PROBLEMS MADE IT FOR YOU TO DO YOUR WORK, TAKE CARE OF THINGS AT HOME, OR GET ALONG WITH OTHER PEOPLE: VERY DIFFICULT
2. NOT BEING ABLE TO STOP OR CONTROL WORRYING: NEARLY EVERY DAY
7. FEELING AFRAID AS IF SOMETHING AWFUL MIGHT HAPPEN: NEARLY EVERY DAY
1. FEELING NERVOUS, ANXIOUS, OR ON EDGE: NEARLY EVERY DAY
3. WORRYING TOO MUCH ABOUT DIFFERENT THINGS: NEARLY EVERY DAY
6. BECOMING EASILY ANNOYED OR IRRITABLE: NEARLY EVERY DAY

## 2017-05-02 ASSESSMENT — PATIENT HEALTH QUESTIONNAIRE - PHQ9: 5. POOR APPETITE OR OVEREATING: MORE THAN HALF THE DAYS

## 2017-05-02 NOTE — MR AVS SNAPSHOT
"              After Visit Summary   5/2/2017    Kina Pond    MRN: [id number]           Patient Information     Date Of Birth          1985        Visit Information        Provider Department      5/2/2017 1:30 PM Christine Clifford, [facility]        Today's Diagnoses     Other mixed anxiety disorders    -  1       Follow-ups after your visit        Your next 10 appointments already scheduled     May 26, 2017 11:00 AM CDT   (Arrive by 10:45 AM)   Return Visit with Shannan [facility], [facility]    [address] [place] [id number] [phone]            Jun 14, 2017  7:30 PM CDT   PSG Diagnostic/MSLT with [facility] SLEEP STUDY RM1   [facility] ([facility] )    [address] [place] [address] [phone]              Who to contact     If you have questions or need follow up information about today's clinic visit or your schedule please contact [facility] directly at [phone].  Normal or non-critical lab and imaging results will be communicated to you by [employer], letter or phone within 4 business days after the clinic has received the results. If you do not hear from us within 7 days, please contact the clinic through [employer] or phone. If you have a critical or abnormal lab result, we will notify you by phone as soon as possible.  Submit refill requests through [employer] or call your pharmacy and they will forward the refill request to us. Please allow 3 business days for your refill to be completed.          Additional Information About Your Visit        [employer] Information     [employer] lets you send messages to your doctor, view your test results, renew your prescriptions, schedule appointments and more. To sign up, go to [email or web address] . Click on \"Log in\" on the left side of the screen, which will take you to the Welcome page. Then click on \"Sign up Now\" on the right side of the page.     You " will be asked to enter the access code listed below, as well as some personal information. Please follow the directions to create your username and password.     Your access code is: [email or web address]  Expires: 2017  9:24 AM     Your access code will  in 90 days. If you need help or a new code, please call your [facility] clinic or [phone].        Care EveryWhere ID     This is your Care EveryWhere ID. This could be used by other organizations to access your [facility] medical records  [id number]        Your Vitals Were     Last Period                   2017            Blood Pressure from Last 3 Encounters:   17 110/60   05/10/17 112/62   17 110/64    Weight from Last 3 Encounters:   17 120 lb (54.4 kg)   05/10/17 120 lb (54.4 kg)   17 120 lb (54.4 kg)              Today, you had the following     No orders found for display         Today's Medication Changes          These changes are accurate as of: 17 11:59 PM.  If you have any questions, ask your nurse or doctor.               Start taking these medicines.        Dose/Directions    escitalopram 5 MG tablet   Commonly known as:  LEXAPRO   Used for:  Moderate episode of recurrent major depressive disorder (H)   Started by:  Makayla Khoury PA        TAKE 1 TABLET BY MOUTH DAILY   Quantity:  30 tablet   Refills:  0            Where to get your medicines      These medications were sent to [id number] - [place] -  E  AT [facility] [address], [place] [address]     Phone:  [phone]     escitalopram 5 MG tablet                Primary Care Provider Office Phone # Fax #    DAYANNA Goodman [phone]       [facility] [address] [facility] [address]  [place] [address]        Thank you!     Thank you for choosing [facility]  for your care. Our goal is always to provide you with excellent care. Hearing back from our patients is one way we can  continue to improve our services. Please take a few minutes to complete the written survey that you may receive in the mail after your visit with us. Thank you!             Your Updated Medication List - Protect others around you: Learn how to safely use, store and throw away your medicines at www.disposemymeds.org.          This list is accurate as of: 5/2/17 11:59 PM.  Always use your most recent med list.                   Brand Name Dispense Instructions for use    ALPRAZolam 0.5 MG tablet    XANAX    15 tablet    Take 1 tablet (0.5 mg) by mouth 2 times daily as needed for anxiety       clindamycin 1 % topical gel    CLINDAMAX    60 g    Apply topically 2 times daily       cyclobenzaprine 10 MG tablet    FLEXERIL    30 tablet    Take 0.5-1 tablets (5-10 mg) by mouth 3 times daily as needed for muscle spasms       escitalopram 5 MG tablet    LEXAPRO    30 tablet    TAKE 1 TABLET BY MOUTH DAILY       etonogestrel 68 MG Impl    IMPLANON/NEXPLANON     1 each (68 mg) by Subdermal route continuous       fluticasone 50 MCG/ACT spray    FLONASE    1 Bottle    Spray 1 spray into both nostrils daily       meclizine 25 MG tablet    ANTIVERT    30 tablet    Take 1 tablet (25 mg) by mouth every 6 hours as needed for dizziness       polyethylene glycol powder    MIRALAX    510 g    Take 17 g (1 capful) by mouth daily As needed for irritable bowel sx.  If no bm in 2 day take one scoop       traZODone 50 MG tablet    DESYREL    30 tablet    TAKE 1 TABLET(50 MG) BY MOUTH EVERY NIGHT AS NEEDED FOR SLEEP

## 2017-05-04 ENCOUNTER — OFFICE VISIT (OUTPATIENT)
Dept: BEHAVIORAL HEALTH | Facility: OTHER | Age: 32
End: 2017-05-04
Attending: SOCIAL WORKER
Payer: COMMERCIAL

## 2017-05-04 ENCOUNTER — OFFICE VISIT (OUTPATIENT)
Dept: FAMILY MEDICINE | Facility: OTHER | Age: 32
End: 2017-05-04
Attending: PHYSICIAN ASSISTANT
Payer: COMMERCIAL

## 2017-05-04 VITALS
WEIGHT: 120 LBS | TEMPERATURE: 98.1 F | HEART RATE: 88 BPM | OXYGEN SATURATION: 96 % | DIASTOLIC BLOOD PRESSURE: 64 MMHG | SYSTOLIC BLOOD PRESSURE: 110 MMHG | BODY MASS INDEX: 21.26 KG/M2

## 2017-05-04 DIAGNOSIS — F41.1 GENERALIZED ANXIETY DISORDER: ICD-10-CM

## 2017-05-04 DIAGNOSIS — R20.2 PARESTHESIA OF UPPER AND LOWER EXTREMITIES OF BOTH SIDES: Primary | ICD-10-CM

## 2017-05-04 DIAGNOSIS — F33.1 MODERATE EPISODE OF RECURRENT MAJOR DEPRESSIVE DISORDER (H): ICD-10-CM

## 2017-05-04 DIAGNOSIS — R53.82 CHRONIC FATIGUE: ICD-10-CM

## 2017-05-04 DIAGNOSIS — F41.0 PANIC DISORDER WITHOUT AGORAPHOBIA: Primary | ICD-10-CM

## 2017-05-04 PROCEDURE — 99214 OFFICE O/P EST MOD 30 MIN: CPT | Performed by: PHYSICIAN ASSISTANT

## 2017-05-04 PROCEDURE — 90834 PSYTX W PT 45 MINUTES: CPT | Performed by: SOCIAL WORKER

## 2017-05-04 PROCEDURE — 99212 OFFICE O/P EST SF 10 MIN: CPT

## 2017-05-04 ASSESSMENT — ANXIETY QUESTIONNAIRES
3. WORRYING TOO MUCH ABOUT DIFFERENT THINGS: NEARLY EVERY DAY
6. BECOMING EASILY ANNOYED OR IRRITABLE: NEARLY EVERY DAY
GAD7 TOTAL SCORE: 17
5. BEING SO RESTLESS THAT IT IS HARD TO SIT STILL: SEVERAL DAYS
2. NOT BEING ABLE TO STOP OR CONTROL WORRYING: NEARLY EVERY DAY
IF YOU CHECKED OFF ANY PROBLEMS ON THIS QUESTIONNAIRE, HOW DIFFICULT HAVE THESE PROBLEMS MADE IT FOR YOU TO DO YOUR WORK, TAKE CARE OF THINGS AT HOME, OR GET ALONG WITH OTHER PEOPLE: VERY DIFFICULT
7. FEELING AFRAID AS IF SOMETHING AWFUL MIGHT HAPPEN: NEARLY EVERY DAY
1. FEELING NERVOUS, ANXIOUS, OR ON EDGE: MORE THAN HALF THE DAYS

## 2017-05-04 ASSESSMENT — PATIENT HEALTH QUESTIONNAIRE - PHQ9: 5. POOR APPETITE OR OVEREATING: MORE THAN HALF THE DAYS

## 2017-05-04 ASSESSMENT — PAIN SCALES - GENERAL: PAINLEVEL: MODERATE PAIN (5)

## 2017-05-04 NOTE — MR AVS SNAPSHOT
After Visit Summary   5/4/2017    Kina Pond    MRN: [id number]           Patient Information     Date Of Birth          1985        Visit Information        Provider Department      5/4/2017 9:00 AM Pamela Peters, [facility]        Today's Diagnoses     Panic disorder without agoraphobia    -  1    Generalized anxiety disorder        Moderate episode of recurrent major depressive disorder (H)           Follow-ups after your visit        Your next 10 appointments already scheduled     May 10, 2017  1:30 PM CDT   (Arrive by 1:15 PM)   Return Visit with Pamela Peters, [facility]    [address] [place], [place] [address] [id number] [phone]            May 18, 2017  9:00 AM CDT   (Arrive by 8:45 AM)   Return Visit with Christine Clifford, [facility] ([facility])    [address] [place], [place] [address] [id number] [phone]            Aug 15, 2017  1:30 PM CDT   (Arrive by 1:15 PM)   New Visit with KEILA Raymond MD   [facility] ([facility])    [address] [place], [place] [address] [id number] [phone]              Who to contact     If you have questions or need follow up information about today's clinic visit or your schedule please contact [facility] directly at [phone].  Normal or non-critical lab and imaging results will be communicated to you by MyChart, letter or phone within 4 business days after the clinic has received the results. If you do not hear from us within 7 days, please contact the clinic through MyChart or phone. If you have a critical or abnormal lab result, we will notify you by phone as soon as possible.  Submit refill requests through [employer] or call your pharmacy and they will forward the refill request to us. Please allow 3 business days for your refill to be completed.          Additional Information About Your Visit        [facility]  "Information     [employer] lets you send messages to your doctor, view your test results, renew your prescriptions, schedule appointments and more. To sign up, go to [email or web address] . Click on \"Log in\" on the left side of the screen, which will take you to the Welcome page. Then click on \"Sign up Now\" on the right side of the page.     You will be asked to enter the access code listed below, as well as some personal information. Please follow the directions to create your username and password.     Your access code is: [email or web address]  Expires: 2017  9:24 AM     Your access code will  in 90 days. If you need help or a new code, please call your [facility] clinic or [phone].        Care EveryWhere ID     This is your Care EveryWhere ID. This could be used by other organizations to access your [facility] medical records  [id number]        Your Vitals Were     Last Period                   2017            Blood Pressure from Last 3 Encounters:   17 110/64   17 110/68   17 104/82    Weight from Last 3 Encounters:   17 120 lb (54.4 kg)   17 123 lb (55.8 kg)   02/15/17 118 lb (53.5 kg)              Today, you had the following     No orders found for display       Primary Care Provider Office Phone # Fax #    DAYANNA Goodman [phone]       [address] [address]        Thank you!     Thank you for choosing [facility]  for your care. Our goal is always to provide you with excellent care. Hearing back from our patients is one way we can continue to improve our services. Please take a few minutes to complete the written survey that you may receive in the mail after your visit with us. Thank you!             Your Updated Medication List - Protect others around you: Learn how to safely use, store and throw away your medicines at www.disposemymeds.org.          This list is accurate as of: 17  " 4:50 PM.  Always use your most recent med list.                   Brand Name Dispense Instructions for use    ALPRAZolam 0.5 MG tablet    XANAX    15 tablet    Take 1 tablet (0.5 mg) by mouth 2 times daily as needed for anxiety       clindamycin 1 % topical gel    CLINDAMAX    60 g    Apply topically 2 times daily       cyclobenzaprine 10 MG tablet    FLEXERIL    30 tablet    Take 0.5-1 tablets (5-10 mg) by mouth 3 times daily as needed for muscle spasms       etonogestrel 68 MG Impl    IMPLANON/NEXPLANON     1 each (68 mg) by Subdermal route continuous       fluticasone 50 MCG/ACT spray    FLONASE    1 Bottle    Spray 1 spray into both nostrils daily       meclizine 25 MG tablet    ANTIVERT    30 tablet    Take 1 tablet (25 mg) by mouth every 6 hours as needed for dizziness       polyethylene glycol powder    MIRALAX    510 g    Take 17 g (1 capful) by mouth daily As needed for irritable bowel sx.  If no bm in 2 day take one scoop       traZODone 50 MG tablet    DESYREL    30 tablet    TAKE 1 TABLET(50 MG) BY MOUTH EVERY NIGHT AS NEEDED FOR SLEEP

## 2017-05-04 NOTE — PROGRESS NOTES
[facility]  May 4, 2017    Behavioral Health Clinician Progress Note    Patient Name: Kina Pond           Service Type: Individual      Service Location:   Face to Face in Clinic     Session Start Time: 8:30  Session End Time: 9:15      Session Length: 38 - 52      Attendees: Patient and daughter present    Visit Activities (Refresh list every visit): [facility] Only    Diagnostic Assessment Date: 11/23/15  Treatment Plan Review Date: See [facility] flowsheet.  See Flowsheets for today's PHQ-9 and DANIEL-7 results  Previous PHQ-9:   PHQ-9 SCORE 1/18/2017 4/14/2017 5/4/2017   Total Score - - -   Total Score 14 14 17     Previous DANIEL-7:   DANIEL-7 SCORE 1/18/2017 4/14/2017 5/4/2017   Total Score 20 15 17       ACE LEVEL:  ACE Score (Last Two) 11/10/2016 12/1/2016   ACE Raw Score 28 32   Activation Score 50 62.6   ACE Level 2 3       DATA  Extended Session (60+ minutes): No  Interactive Complexity: No  Crisis: No    Treatment Objective(s) Addressed in This Session:  Target Behavior(s): disease management/lifestyle changes related to symptoms of anxiety    Anxiety: will experience a reduction in anxiety, will develop more effective coping skills to manage anxiety symptoms, will develop healthy cognitive patterns and beliefs and will increase ability to function adaptively  Functional Impairment: will effectively address problems that interfere with adaptive functioning    Current Stressors / Issues:  Patient indicating she is currently dealing with extreme tiredness.  States that this is different than when she has had mental health symptoms that make her tired.  She states that when it has been a mental health symptom, she does not have motivation to do things but know's she should, and is physically able to do them.  Explaining now that she feels she is physically unable to do things, is exhausted all the time, but is mentally motivated and has desire to do them.  States that she does not feel as high of  a level of anxiety as she normally does either.  Also reports that her memory is really bad lately.  Has been having other physical symptoms she is questioning.      Reviewed coping skills- trying to do breathing, but sometimes doesn't work for her.  Has been pushing herself to do different activities, but reports is exhausted and sleeps the rest of the day after any big activity.  Reports she has been sleeping well at night.    Progress on Treatment Objective(s) / Homework:  New Objective established this session - PRECONTEMPLATION (Not seeing need for change); Intervened by educating the patient about the effects of current behavior on health.  Evoked information about reasons to continue behavior, express concern / recommendations, and explored any change talk    Motivational Interviewing    MI Intervention: Expressed Empathy/Understanding, Supported Autonomy, Collaboration, Evocation, Permission to raise concern or advise, Open-ended questions, Reflections: simple and complex and Reframe     Change Talk Expressed by the Patient: Desire to change Ability to change Reasons to change    Provider Response to Change Talk: E - Evoked more info from patient about behavior change, A - Affirmed patient's thoughts, decisions, or attempts at behavior change, R - Reflected patient's change talk and S - Summarized patient's change talk statements      Care Plan review completed: No    Medication Review:  No changes to current psychiatric medication(s)    Medication Compliance:  Yes    Changes in Health Issues:   Yes: has muliple physical health symptoms.  Is seeing PCP today.  These are causing psychological distress.    Chemical Use Review:   Substance Use: Chemical use reviewed, no active concerns identified      Tobacco Use: No change in amount of tobacco use since last session.  PCP discussed cesation    Assessment: Current Emotional / Mental Status (status of significant symptoms):  Risk status (Self / Other harm or  suicidal ideation)  Patient denies a history of suicidal ideation, suicide attempts, self-injurious behavior, homicidal ideation, homicidal behavior and and other safety concerns  Patient denies current fears or concerns for personal safety.  Patient denies current or recent suicidal ideation or behaviors.  Patient denies current or recent homicidal ideation or behaviors.  Patient denies current or recent self injurious behavior or ideation.  Patient denies other safety concerns.  A safety and risk management plan has not been developed at this time, however patient was encouraged to call [place] [phone] should there be a change in any of these risk factors.     Appearance:   Appropriate   Eye Contact:   Fair   Psychomotor Behavior: Normal   Attitude:   Cooperative   Orientation:   All  Speech   Rate / Production: Normal    Volume:  Normal   Mood:    Normal  Affect:    Blunted  Lethargic   Thought Content:  Clear   Thought Form:  Coherent  Flight of Ideas  Logical   Insight:    Fair     Diagnoses:  1. Panic disorder without agoraphobia    2. Generalized anxiety disorder    3. Moderate episode of recurrent major depressive disorder (H)        Collateral Reports Completed:  Routed note to Care Team Member(s)    Plan: (Homework, other):  Patient was given information about behavioral services and encouraged to schedule a follow up appointment with the clinic [facility] in 1 week.  She was also given Cognitive Behavioral Therapy skills to practice when experiencing anxiety and depression.  CD Recommendations: Maintain Sobriety.  TALIA Silva, [facility]    TALIA Cox  May 4, 2017

## 2017-05-04 NOTE — PATIENT INSTRUCTIONS
Thank you for choosing [facility].   I have office hours 8:00 am to 4:30 pm on Monday's, Wednesday's, Thursday's and Friday's. My nurse and I are out of the office every Tuesday.    Following your visit, when your labs and diagnostic testing have returned, I will review then and you will be contacted by my nurse.  If you are on My Chart, you can also view results there.    For refills, notify your pharmacy regarding what you need and the pharmacy will generate a refill request. Do not call my nurse as she is unable to process refill request. Please plan ahead and allow 3-5 days for refill requests.    You will generally receive a reminder call the day prior to your appointment.  If you cannot attend your appointment, please cancel your appointment with as much notice as possible.  If there is a pattern of failure to present for your appointments, I cannot provide consistent, meaningful, ongoing care for you. It is very important to me that you come in for your care, so we can best assist you with your health care needs.    IMPORTANT:  Please note that it is my standard of practice to NOT participate in prescribing ongoing requested Narcotic Analgesic therapy, and/or participate in the prescribing of other controlled substances.  My nurse and I am happy to assist you with the process of referral for alternative pain management as needed, and other treatment modalities including but not limited to:  Physical Therapy, Physical Medicine and Rehab, Counseling, Chiropractic Care, Orthopedic Care, and non-narcotic medication management.     In the event that you need to be seen for emergent concerns and I am out of office,  please see one of my colleagues for acute concerns.  You may also present to  or ER.  I appreciate the opportunity to serve you and look forward to supporting your healthcare needs in the future. Please contact me with any questions or concerns that you may  have.    Sincerely,      Makayla Khoury RN, PA-C

## 2017-05-04 NOTE — MR AVS SNAPSHOT
After Visit Summary   5/4/2017    Kina Pond    MRN: [id number]           Patient Information     Date Of Birth          1985        Visit Information        Provider Department      5/4/2017 11:00 AM Makayla Khoury PA [facility]        Today's Diagnoses     Paresthesia of upper and lower extremities of both sides    -  1    Chronic fatigue          Care Instructions        Thank you for choosing [facility].   I have office hours 8:00 am to 4:30 pm on Monday's, Wednesday's, Thursday's and Friday's. My nurse and I are out of the office every Tuesday.    Following your visit, when your labs and diagnostic testing have returned, I will review then and you will be contacted by my nurse.  If you are on My Chart, you can also view results there.    For refills, notify your pharmacy regarding what you need and the pharmacy will generate a refill request. Do not call my nurse as she is unable to process refill request. Please plan ahead and allow 3-5 days for refill requests.    You will generally receive a reminder call the day prior to your appointment.  If you cannot attend your appointment, please cancel your appointment with as much notice as possible.  If there is a pattern of failure to present for your appointments, I cannot provide consistent, meaningful, ongoing care for you. It is very important to me that you come in for your care, so we can best assist you with your health care needs.    IMPORTANT:  Please note that it is my standard of practice to NOT participate in prescribing ongoing requested Narcotic Analgesic therapy, and/or participate in the prescribing of other controlled substances.  My nurse and I am happy to assist you with the process of referral for alternative pain management as needed, and other treatment modalities including but not limited to:  Physical Therapy, Physical Medicine and Rehab, Counseling, Chiropractic Care, Orthopedic Care,  and non-narcotic medication management.     In the event that you need to be seen for emergent concerns and I am out of office,  please see one of my colleagues for acute concerns.  You may also present to UC or ER.  I appreciate the opportunity to serve you and look forward to supporting your healthcare needs in the future. Please contact me with any questions or concerns that you may have.    Sincerely,      Makayla Khoury RN, PA-C             Follow-ups after your visit        Your next 10 appointments already scheduled     May 10, 2017  1:30 PM CDT   (Arrive by 1:15 PM)   Return Visit with Pamela Peters, [facility] ([facility])    [address] [place] [id number] [phone]            May 18, 2017  9:00 AM CDT   (Arrive by 8:45 AM)   Return Visit with Christine Clifford, [facility] [place] [facility] ([facility])    [address] [place], [place] [address] [place] [id number] [phone]            Aug 15, 2017  1:30 PM CDT   (Arrive by 1:15 PM)   New Visit with KEILA Raymond MD   [facility] ([facility])    [address] [place] [id number] [phone]              Who to contact     If you have questions or need follow up information about today's clinic visit or your schedule please contact [facility] directly at [phone].  Normal or non-critical lab and imaging results will be communicated to you by MyChart, letter or phone within 4 business days after the clinic has received the results. If you do not hear from us within 7 days, please contact the clinic through MyChart or phone. If you have a critical or abnormal lab result, we will notify you by phone as soon as possible.  Submit refill requests through [employer] or call your pharmacy and they will forward the refill request to us. Please allow 3 business days for your refill to be completed.          Additional Information About Your Visit        MyChart  "Information     Fitly lets you send messages to your doctor, view your test results, renew your prescriptions, schedule appointments and more. To sign up, go to [email or web address] . Click on \"Log in\" on the left side of the screen, which will take you to the Welcome page. Then click on \"Sign up Now\" on the right side of the page.     You will be asked to enter the access code listed below, as well as some personal information. Please follow the directions to create your username and password.     Your access code is: [email or web address]  Expires: 2017  9:24 AM     Your access code will  in 90 days. If you need help or a new code, please call your [place] clinic or [phone].        Care EveryWhere ID     This is your [facility] EveryWhere ID. This could be used by other organizations to access your [place] medical records  [id number]        Your Vitals Were     Pulse Temperature Last Period Pulse Oximetry Breastfeeding? BMI (Body Mass Index)    88 98.1  F (36.7  C) (Tympanic) 2017 96% No 21.26 kg/m2       Blood Pressure from Last 3 Encounters:   17 110/64   17 110/68   17 104/82    Weight from Last 3 Encounters:   17 120 lb (54.4 kg)   17 123 lb (55.8 kg)   02/15/17 118 lb (53.5 kg)              We Performed the Following     MR Brain w/o Contrast        Primary Care Provider Office Phone # Fax #    DAYANNA Goodman [phone]       [facility] [address]  [place] [address]        Thank you!     Thank you for choosing [facility] ([facility]  for your care. Our goal is always to provide you with excellent care. Hearing back from our patients is one way we can continue to improve our services. Please take a few minutes to complete the written survey that you may receive in the mail after your visit with us. Thank you!             Your Updated Medication List - Protect others around you: Learn how to safely use, store and throw " away your medicines at www.disposemymeds.org.          This list is accurate as of: 5/4/17 11:30 AM.  Always use your most recent med list.                   Brand Name Dispense Instructions for use    ALPRAZolam 0.5 MG tablet    XANAX    15 tablet    Take 1 tablet (0.5 mg) by mouth 2 times daily as needed for anxiety       clindamycin 1 % topical gel    CLINDAMAX    60 g    Apply topically 2 times daily       cyclobenzaprine 10 MG tablet    FLEXERIL    30 tablet    Take 0.5-1 tablets (5-10 mg) by mouth 3 times daily as needed for muscle spasms       etonogestrel 68 MG Impl    IMPLANON/NEXPLANON     1 each (68 mg) by Subdermal route continuous       fluticasone 50 MCG/ACT spray    FLONASE    1 Bottle    Spray 1 spray into both nostrils daily       meclizine 25 MG tablet    ANTIVERT    30 tablet    Take 1 tablet (25 mg) by mouth every 6 hours as needed for dizziness       polyethylene glycol powder    MIRALAX    510 g    Take 17 g (1 capful) by mouth daily As needed for irritable bowel sx.  If no bm in 2 day take one scoop       traZODone 50 MG tablet    DESYREL    30 tablet    TAKE 1 TABLET(50 MG) BY MOUTH EVERY NIGHT AS NEEDED FOR SLEEP

## 2017-05-05 RX ORDER — ESCITALOPRAM OXALATE 5 MG/1
TABLET ORAL
Qty: 30 TABLET | Refills: 0 | Status: SHIPPED | OUTPATIENT
Start: 2017-05-05 | End: 2017-06-22 | Stop reason: DRUGHIGH

## 2017-05-05 ASSESSMENT — ANXIETY QUESTIONNAIRES: GAD7 TOTAL SCORE: 17

## 2017-05-05 ASSESSMENT — PATIENT HEALTH QUESTIONNAIRE - PHQ9: SUM OF ALL RESPONSES TO PHQ QUESTIONS 1-9: 17

## 2017-05-09 ENCOUNTER — TELEPHONE (OUTPATIENT)
Dept: FAMILY MEDICINE | Facility: OTHER | Age: 32
End: 2017-05-09

## 2017-05-09 ENCOUNTER — HOSPITAL ENCOUNTER (OUTPATIENT)
Dept: MRI IMAGING | Facility: HOSPITAL | Age: 32
Discharge: HOME | End: 2017-05-09
Attending: PHYSICIAN ASSISTANT | Admitting: PHYSICIAN ASSISTANT
Payer: COMMERCIAL

## 2017-05-09 DIAGNOSIS — R53.82 CHRONIC FATIGUE: Primary | ICD-10-CM

## 2017-05-09 PROCEDURE — 70553 MRI BRAIN STEM W/O & W/DYE: CPT | Mod: TC

## 2017-05-09 PROCEDURE — A9585 GADOBUTROL INJECTION: HCPCS | Mod: TC

## 2017-05-09 RX ORDER — GADOBUTROL 604.72 MG/ML
2 INJECTION INTRAVENOUS ONCE
Status: COMPLETED | OUTPATIENT
Start: 2017-05-09 | End: 2017-05-09

## 2017-05-09 RX ADMIN — GADOBUTROL 2 ML: 604.72 INJECTION INTRAVENOUS at 10:24

## 2017-05-10 ENCOUNTER — OFFICE VISIT (OUTPATIENT)
Dept: SLEEP MEDICINE | Facility: HOSPITAL | Age: 32
End: 2017-05-10
Attending: PHYSICIAN ASSISTANT
Payer: COMMERCIAL

## 2017-05-10 VITALS
SYSTOLIC BLOOD PRESSURE: 112 MMHG | HEART RATE: 67 BPM | HEIGHT: 63 IN | BODY MASS INDEX: 21.26 KG/M2 | WEIGHT: 120 LBS | OXYGEN SATURATION: 100 % | RESPIRATION RATE: 12 BRPM | DIASTOLIC BLOOD PRESSURE: 62 MMHG

## 2017-05-10 DIAGNOSIS — R53.82 CHRONIC FATIGUE: ICD-10-CM

## 2017-05-10 DIAGNOSIS — G47.419 NARCOLEPSY WITHOUT CATAPLEXY(347.00): Primary | ICD-10-CM

## 2017-05-10 PROCEDURE — 99242 OFF/OP CONSLTJ NEW/EST SF 20: CPT | Performed by: INTERNAL MEDICINE

## 2017-05-10 PROCEDURE — 99213 OFFICE O/P EST LOW 20 MIN: CPT

## 2017-05-10 ASSESSMENT — ENCOUNTER SYMPTOMS
ORTHOPNEA: 0
NERVOUS/ANXIOUS: 1
PND: 0
HEADACHES: 0
INSOMNIA: 0

## 2017-05-10 NOTE — PROGRESS NOTES
"HPI Comments: 30 y/o referred by Mkaayla Khoury with daytime somnolence. Pt is very sleepy, this has been progressive over a few years. Her sleep hours are 8P to 630A, no insomnia. She doesn't snore, she does remember dreaming. She has episodes of sleep paralysis, no cataplexy. No AM HAs, no RLS. Normal weight.          She has a hx of significant anxiety depression, prior hx of chem dep, now in remission.     SH  3 children youngest daughter with her, cute and lots of energy        Review of Systems   Constitutional: Positive for malaise/fatigue.   HENT: Negative for congestion.    Cardiovascular: Negative for orthopnea and PND.   Neurological: Negative for headaches.   Psychiatric/Behavioral: The patient is nervous/anxious. The patient does not have insomnia.          Physical Exam   Constitutional: She is oriented to person, place, and time and well-developed, well-nourished, and in no distress.   HENT:   Head: Normocephalic.   Cardiovascular: Normal rate.    Pulmonary/Chest: Effort normal.   Neurological: She is alert and oriented to person, place, and time. Gait normal.   Skin: Skin is warm and dry.   Psychiatric: Mood, memory, affect and judgment normal.       /62  Pulse 67  Resp 12  Ht 5' 3\" (1.6 m)  Wt 120 lb (54.4 kg)  LMP 04/05/2017  SpO2 100%  BMI 21.26 kg/m2      Current Outpatient Prescriptions:      escitalopram (LEXAPRO) 5 MG tablet, TAKE 1 TABLET BY MOUTH DAILY, Disp: 30 tablet, Rfl: 0     ALPRAZolam (XANAX) 0.5 MG tablet, Take 1 tablet (0.5 mg) by mouth 2 times daily as needed for anxiety, Disp: 15 tablet, Rfl: 0     traZODone (DESYREL) 50 MG tablet, TAKE 1 TABLET(50 MG) BY MOUTH EVERY NIGHT AS NEEDED FOR SLEEP, Disp: 30 tablet, Rfl: 3     meclizine (ANTIVERT) 25 MG tablet, Take 1 tablet (25 mg) by mouth every 6 hours as needed for dizziness, Disp: 30 tablet, Rfl: 1     fluticasone (FLONASE) 50 MCG/ACT spray, Spray 1 spray into both nostrils daily, Disp: 1 Bottle, Rfl: 0     " clindamycin (CLINDAMAX) 1 % topical gel, Apply topically 2 times daily, Disp: 60 g, Rfl: 11     cyclobenzaprine (FLEXERIL) 10 MG tablet, Take 0.5-1 tablets (5-10 mg) by mouth 3 times daily as needed for muscle spasms, Disp: 30 tablet, Rfl: 1     etonogestrel (IMPLANON/NEXPLANON) 68 MG IMPL, 1 each (68 mg) by Subdermal route continuous, Disp: , Rfl:      polyethylene glycol (MIRALAX) powder, Take 17 g (1 capful) by mouth daily As needed for irritable bowel sx.  If no bm in 2 day take one scoop, Disp: 510 g, Rfl: 1     A/ Poss narcolepsy, sleep and a nap.

## 2017-05-10 NOTE — MR AVS SNAPSHOT
After Visit Summary   5/10/2017    Kina Pond    MRN: [id number]           Patient Information     Date Of Birth          1985        Visit Information        Provider Department      5/10/2017 11:00 AM Migule Kc MD [facility]        Today's Diagnoses     Chronic fatigue          Care Instructions    In order to help your stay at the Sleep Center to be as comfortable as possible and to obtain the best sleep study possible, the Sleep Center Staff has established the following guidelines:    1.  Please attempt to be here 15 minutes prior to your scheduled appointment time.  If you anticipate being late or you cannot make your overnight appointment, please call us at [phone] or call [phone] and ask for the Sleep Center.  PLEASE MAKE EVERY ATTEMPT TO MAKE YOUR APPOINTMENT.  A SLEEP TECHNICIAN AND A SLEEP ROOM HAS BEEN RESERVED JUST FOR YOU.    2. When you arrive at [facility], please park in the upper lot, by [address].  Enter the hospital at the Emergency Room Entrance.  Follow the signs to the Emergency Room Admitting Desk and tell them you are here for a sleep study in the Sleep Disorder Center.    3. Do not stop any medications, unless specifically requested.  Be sure to bring all medications that you need with you.    4. Do not use any hair creams or gels, moisturizers, rinses or sprays the day of your study.  FOR MALES:  if you are usually clean shaven, please shave before you come in; FOR FEMALES:  do not wear make-up or be prepared to remove it.  This will improve the quality of the study.    5. Please DO NOT USE CAFFEINE OR ALCOHOL after 2:00 PM the day of your test unless advised otherwise.    6. Do not take any naps the day of your test.    7. Attachment of monitoring equipment will take approximately one hour, including an explanation of the test.    8. Bring comfortable night clothes to sleep in, two-piece, cotton pajamas or shorts are best.    9. If  you have a pillow that you prefer using, please bring it with you; but do not forget to take it home with you in the morning.    10. Most of our studies are complete by approximately 7:00 AM.  In most instances we may wake you during your normal wake time or the time you request to be awakened.    If you have any special needs or questions related to this information or your test, please feel free to call the Sleep Disorder Center at [phone] or [phone] and ask for the Sleep Disorder Center.  Please make every effort to keep your appointment.  A sleep technician and a sleep room have been reserved just for you.  In the event that you are unable to make your appointment, please call as soon as possible to notify us of your cancellation.    Multiple Sleep Latency Test    The following is a brief description of the Multiple Sleep Latency Test (MSLT).  The test will be performed the day following your all-night sleep study.  You may be asked to stay for this testing if your nighttime test is normal.  This test is a procedure that monitors your daytime sleepiness.    After you have finished with you all-night sleep study, you will receive a complimentary breakfast.  This may be eaten in the lab in which you are tested.  A minimal amount of apparatus will remain attached to you after your nighttime study; therefore, you will not be able to shower in the morning.  You may shower after the daytime test.  We ask that you refrain from tobacco and caffeine drinks.    The initial procedure will begin 1-2 hours after you wake in the morning and will continue throughout the day at 2 hour intervals for a total of 5 naps.  Please be prepared to stay all day (testing usually ends between 4-5 PM).  Each interval will consist of a 20-30 minute nap at which we will be recording the same functions that were monitored during the night study.  The times given to you by the technicians are essential for the study, so please be back in the  Sleep Center at the appropriate times.    We encourage you to bring books, writing materials, or handicrafts to do between testing times.  Afternoon testing time you will receive complimentary lunch.  This may be eaten in the Lab.  Between each of your testing times, you may move about the hospital or choose to remain in the room.  If you choose to leave the testing area, we ask that you report back to the Lab 15 minutes before each test.    We hope that this has answered some of your questions and alleviated any anxiety you may have regarding the procedure.  Please do not hesitate to call should you have any questions concerning the MSLT procedure at [phone] or or [phone] and ask for the Sleep Center.    Please make every effort to keep your appointment.  A sleep technician and a sleep room have been reserved just for you.  In the event that you are unable to make your appointment, please call as soon as possible to notify us of your cancellation.          Follow-ups after your visit        Your next 10 appointments already scheduled     May 10, 2017  1:30 PM CDT   (Arrive by 1:15 PM)   Return Visit with Pamela Peters, [facility])    [address] [place] [id number] [phone]            May 18, 2017  9:00 AM CDT   (Arrive by 8:45 AM)   Return Visit with Christine Clifford, [facility] ([facility])    [address] [address] [phone]            May 23, 2017  7:30 PM CDT   PSG Diagnostic/MSLT [facility] )    [address] [id number] [phone]            Aug 15, 2017  1:30 PM CDT   (Arrive by 1:15 PM)   New Visit with KEILA Raymond MD   [facility])    [address] [place] [id number] [phone]              Who to contact     If you have questions or need follow up  "information about today's clinic visit or your schedule please contact [facility] directly at [phone].  Normal or non-critical lab and imaging results will be communicated to you by MyChart, letter or phone within 4 business days after the clinic has received the results. If you do not hear from us within 7 days, please contact the clinic through MyChart or phone. If you have a critical or abnormal lab result, we will notify you by phone as soon as possible.  Submit refill requests through [employer] or call your pharmacy and they will forward the refill request to us. Please allow 3 business days for your refill to be completed.          Additional Information About Your Visit        MyChart Information     [employer] lets you send messages to your doctor, view your test results, renew your prescriptions, schedule appointments and more. To sign up, go to [email or web address] . Click on \"Log in\" on the left side of the screen, which will take you to the Welcome page. Then click on \"Sign up Now\" on the right side of the page.     You will be asked to enter the access code listed below, as well as some personal information. Please follow the directions to create your username and password.     Your access code is: [email or web address]  Expires: 2017  9:24 AM     Your access code will  in 90 days. If you need help or a new code, please call your [place] clinic or [phone].        Care EveryWhere ID     This is your Care EveryWhere ID. This could be used by other organizations to access your [place] medical records  [id number]        Your Vitals Were     Pulse Respirations Height Last Period Pulse Oximetry BMI (Body Mass Index)    67 12 5' 3\" (1.6 m) 2017 100% 21.26 kg/m2       Blood Pressure from Last 3 Encounters:   05/10/17 112/62   17 110/64   17 110/68    Weight from Last 3 Encounters:   05/10/17 120 lb (54.4 kg)   17 120 lb (54.4 kg)   17 123 lb (55.8 kg)            "   We Performed the Following     SLEEP EVALUATION & MANAGEMENT REFERRAL - ADULT        Primary Care Provider Office Phone # Fax #    DAYANNA Goodman [phone]       [facility] [address] [place] [address]        Thank you!     Thank you for choosing [facility]  for your care. Our goal is always to provide you with excellent care. Hearing back from our patients is one way we can continue to improve our services. Please take a few minutes to complete the written survey that you may receive in the mail after your visit with us. Thank you!             Your Updated Medication List - Protect others around you: Learn how to safely use, store and throw away your medicines at www.disposemymeds.org.          This list is accurate as of: 5/10/17 11:14 AM.  Always use your most recent med list.                   Brand Name Dispense Instructions for use    ALPRAZolam 0.5 MG tablet    XANAX    15 tablet    Take 1 tablet (0.5 mg) by mouth 2 times daily as needed for anxiety       clindamycin 1 % topical gel    CLINDAMAX    60 g    Apply topically 2 times daily       cyclobenzaprine 10 MG tablet    FLEXERIL    30 tablet    Take 0.5-1 tablets (5-10 mg) by mouth 3 times daily as needed for muscle spasms       escitalopram 5 MG tablet    LEXAPRO    30 tablet    TAKE 1 TABLET BY MOUTH DAILY       etonogestrel 68 MG Impl    IMPLANON/NEXPLANON     1 each (68 mg) by Subdermal route continuous       fluticasone 50 MCG/ACT spray    FLONASE    1 Bottle    Spray 1 spray into both nostrils daily       meclizine 25 MG tablet    ANTIVERT    30 tablet    Take 1 tablet (25 mg) by mouth every 6 hours as needed for dizziness       polyethylene glycol powder    MIRALAX    510 g    Take 17 g (1 capful) by mouth daily As needed for irritable bowel sx.  If no bm in 2 day take one scoop       traZODone 50 MG tablet    DESYREL    30 tablet    TAKE 1 TABLET(50 MG) BY MOUTH EVERY NIGHT AS NEEDED FOR SLEEP

## 2017-05-10 NOTE — PATIENT INSTRUCTIONS
In order to help your stay at the Sleep Center to be as comfortable as possible and to obtain the best sleep study possible, the Sleep Center Staff has established the following guidelines:    1.  Please attempt to be here 15 minutes prior to your scheduled appointment time.  If you anticipate being late or you cannot make your overnight appointment, please call us at [phone] or call [phone] and ask for the Sleep Center.  PLEASE MAKE EVERY ATTEMPT TO MAKE YOUR APPOINTMENT.  A SLEEP TECHNICIAN AND A SLEEP ROOM HAS BEEN RESERVED JUST FOR YOU.    2. When you arrive at [facility], please park in the upper lot, by [address].  Enter the hospital at the Emergency Room Entrance.  Follow the signs to the Emergency Room Admitting Desk and tell them you are here for a sleep study in the Sleep Disorder Center.    3. Do not stop any medications, unless specifically requested.  Be sure to bring all medications that you need with you.    4. Do not use any hair creams or gels, moisturizers, rinses or sprays the day of your study.  FOR MALES:  if you are usually clean shaven, please shave before you come in; FOR FEMALES:  do not wear make-up or be prepared to remove it.  This will improve the quality of the study.    5. Please DO NOT USE CAFFEINE OR ALCOHOL after 2:00 PM the day of your test unless advised otherwise.    6. Do not take any naps the day of your test.    7. Attachment of monitoring equipment will take approximately one hour, including an explanation of the test.    8. Bring comfortable night clothes to sleep in, two-piece, cotton pajamas or shorts are best.    9. If you have a pillow that you prefer using, please bring it with you; but do not forget to take it home with you in the morning.    10. Most of our studies are complete by approximately 7:00 AM.  In most instances we may wake you during your normal wake time or the time you request to be awakened.    If you have any special needs or  questions related to this information or your test, please feel free to call the Sleep Disorder Center at [phone] or [phone] and ask for the Sleep Disorder Center.  Please make every effort to keep your appointment.  A sleep technician and a sleep room have been reserved just for you.  In the event that you are unable to make your appointment, please call as soon as possible to notify us of your cancellation.    Multiple Sleep Latency Test    The following is a brief description of the Multiple Sleep Latency Test (MSLT).  The test will be performed the day following your all-night sleep study.  You may be asked to stay for this testing if your nighttime test is normal.  This test is a procedure that monitors your daytime sleepiness.    After you have finished with you all-night sleep study, you will receive a complimentary breakfast.  This may be eaten in the lab in which you are tested.  A minimal amount of apparatus will remain attached to you after your nighttime study; therefore, you will not be able to shower in the morning.  You may shower after the daytime test.  We ask that you refrain from tobacco and caffeine drinks.    The initial procedure will begin 1-2 hours after you wake in the morning and will continue throughout the day at 2 hour intervals for a total of 5 naps.  Please be prepared to stay all day (testing usually ends between 4-5 PM).  Each interval will consist of a 20-30 minute nap at which we will be recording the same functions that were monitored during the night study.  The times given to you by the technicians are essential for the study, so please be back in the Sleep Center at the appropriate times.    We encourage you to bring books, writing materials, or handicrafts to do between testing times.  Afternoon testing time you will receive complimentary lunch.  This may be eaten in the Lab.  Between each of your testing times, you may move about the hospital or choose to remain in the  room.  If you choose to leave the testing area, we ask that you report back to the Lab 15 minutes before each test.    We hope that this has answered some of your questions and alleviated any anxiety you may have regarding the procedure.  Please do not hesitate to call should you have any questions concerning the MSLT procedure at [phone] or or [phone] and ask for the Sleep Center.    Please make every effort to keep your appointment.  A sleep technician and a sleep room have been reserved just for you.  In the event that you are unable to make your appointment, please call as soon as possible to notify us of your cancellation.

## 2017-05-10 NOTE — NURSING NOTE
Patient ID checked with name and date of birth. Reviewed allergies and home medications. Took Vitals and brief history.   Scheduled patent for an overnight test followed by an MSLT reviewed instructions  And gave her a number to call if she needed she did have a good understanding

## 2017-05-10 NOTE — TELEPHONE ENCOUNTER
Patient notified. Would like to proceed with sleep study. States still has feeling of pins and needles all over. Wondering what could be causing this. I advised to have sleep study then to follow up on everything after to discuss.  Ericka Patel LPN

## 2017-05-11 ENCOUNTER — TELEPHONE (OUTPATIENT)
Dept: BEHAVIORAL HEALTH | Facility: OTHER | Age: 32
End: 2017-05-11

## 2017-05-11 NOTE — TELEPHONE ENCOUNTER
"[facility]  @FLOW([id number])@      Social Work Care Navigator Note      Patient: Kina Pond  Date: May 11, 2017  Preferred Name: Kina    Previous PHQ-9:   PHQ-9 SCORE 1/18/2017 4/14/2017 5/4/2017   Total Score - - -   Total Score 14 14 17     Previous DANIEL-7:   DANIEL-7 SCORE 1/18/2017 4/14/2017 5/4/2017   Total Score 20 15 17     ACE LEVEL:  ACE Score (Last Two) 11/10/2016 12/1/2016   ACE Raw Score 28 32   Activation Score 50 62.6   ACE Level 2 3       Preferred Contact: @JACOB([id number])@  Type of Contact Today: Phone call (patient / identified key support person reached)      Data: (subjective / Objective): \"I feel asleep inbetween appts\"  Patient Goals Areas: @FLOW([id number])@  Patient Stated Goals: @FLOW([id number])@  Recent ED/IP Admission or Discharge?   None  Patient Goals:  I can be outside with my kids 10 min a day.          Objectives Addressed Today:  Had appt with sleep doctor-scheduled sleep study  Appointment made to resign [facility] forms for [employer] next week      Current Stressors / Issues:  Sleeping too much    Intervention:  Writing appointments on calendar-realizes can only have 1 appointment a day- or back to back    Assessment: (Progress on Goals / Homework):  Getting outside still    Plan: (Homework, other):  Patient was encouraged to continue to seek condition-related information and education.      Scheduled a Clinic follow up appointment with TAMEKA SAN in 1 week     Patient has set self-identified goals and will monitor progress until the next appointment on: 05/18/17.     MIREILLE Scales, Social Work Care Coordinator               Next 5 appointments (look out 90 days)     May 18, 2017  8:30 AM CDT   (Arrive by 8:15 AM)   Return Visit with MIREILLE Scales   [facility] ([facility])    [address] [place] [id number] [phone]            May 18, 2017  9:00 AM CDT   (Arrive by 8:45 AM)   Return Visit with TALIA Crowley "   [facility] ([facility] [place] [facility])    [address] [place] [address]  [place] [address] [phone]

## 2017-05-16 ENCOUNTER — OFFICE VISIT (OUTPATIENT)
Dept: DERMATOLOGY | Facility: OTHER | Age: 32
End: 2017-05-16
Attending: DERMATOLOGY
Payer: COMMERCIAL

## 2017-05-16 VITALS
TEMPERATURE: 96.6 F | DIASTOLIC BLOOD PRESSURE: 60 MMHG | HEIGHT: 63 IN | SYSTOLIC BLOOD PRESSURE: 110 MMHG | HEART RATE: 80 BPM | WEIGHT: 120 LBS | BODY MASS INDEX: 21.26 KG/M2

## 2017-05-16 DIAGNOSIS — L72.0 KERATIN CYST: Primary | ICD-10-CM

## 2017-05-16 PROCEDURE — 99202 OFFICE O/P NEW SF 15 MIN: CPT | Performed by: DERMATOLOGY

## 2017-05-16 PROCEDURE — 99213 OFFICE O/P EST LOW 20 MIN: CPT

## 2017-05-16 ASSESSMENT — PAIN SCALES - GENERAL: PAINLEVEL: NO PAIN (0)

## 2017-05-16 NOTE — CONSULTS
DATE OF SERVICE:  2017      SUBJECTIVE:  Kimmy Asher comes in with her  and with her daughter today.  She has had some lesions on her jawline bilaterally which appear to be  Keratin  cysts.  She wonders what can be done about them.  Her  points out that she does manipulate them but I doubt that is doing any harm.      OBJECTIVE:  Shows a cluster of cysts on the posterior jawline area at the angle of the jaw. Process bilateral.       ASSESSMENT:  Keratinous cyst.       PLAN:  I think the only answer for these is incision and expression or excision.  I would prefer Dr. Cifuentes do this is she is willing.      I advised that I would send the information to Dr. Cifuentes who can then decide whether the patient would warrant coming in and if so could explain the technique or whatever is necessary.  Photos were taken.  I will send these to Dr. Cifuentes.  Return p.r.n. to see me.  Meds and allergies reviewed.         KEILA SALAZAR MD             D: 2017 12:04   T: 2017 12:45   MT: YUDI      Name:     KIMMY ASHER   MRN:      [id number]        Account:       [id number]   :      1985           Consult Date:  2017      Document: [id number]

## 2017-05-16 NOTE — MR AVS SNAPSHOT
"              After Visit Summary   5/16/2017    Kina Pond    MRN: [id number]           Patient Information     Date Of Birth          1985        Visit Information        Provider Department      5/16/2017 11:30 AM KEILA Raymond MD [facility]        Today's Diagnoses     Keratin cyst    -  1       Follow-ups after your visit        Your next 10 appointments already scheduled     May 26, 2017 11:00 AM CDT   (Arrive by 10:45 AM)   Return Visit with MIREILLE Scales   [facility] ([facility])    [address] [place] [address] [phone]            Jun 14, 2017  7:30 PM CDT   PSG Diagnostic/MSLT with [facility]   [facility] ([facility] )    [address] [place] [address] [phone]              Who to contact     If you have questions or need follow up information about today's clinic visit or your schedule please contact [facility] directly at [phone].  Normal or non-critical lab and imaging results will be communicated to you by MyChart, letter or phone within 4 business days after the clinic has received the results. If you do not hear from us within 7 days, please contact the clinic through Clinical Datahart or phone. If you have a critical or abnormal lab result, we will notify you by phone as soon as possible.  Submit refill requests through [employer] or call your pharmacy and they will forward the refill request to us. Please allow 3 business days for your refill to be completed.          Additional Information About Your Visit        Clinical DataharM[employer] Information     [employer] lets you send messages to your doctor, view your test results, renew your prescriptions, schedule appointments and more. To sign up, go to [email or web address] . Click on \"Log in\" on the left side of the screen, which will take you to the Welcome page. Then click on \"Sign up Now\" on the right side of the page.     You will be asked " "to enter the access code listed below, as well as some personal information. Please follow the directions to create your username and password.     Your access code is: [email or web address]  Expires: 2017  9:24 AM     Your access code will  in 90 days. If you need help or a new code, please call your [place] clinic or [phone].        Care EveryWhere ID     This is your Care EveryWhere ID. This could be used by other organizations to access your [place] medical records  [id number]        Your Vitals Were     Pulse Temperature Height BMI (Body Mass Index)          80 96.6  F (35.9  C) (Tympanic) 5' 3\" (1.6 m) 21.26 kg/m2         Blood Pressure from Last 3 Encounters:   17 110/60   05/10/17 112/62   17 110/64    Weight from Last 3 Encounters:   17 120 lb (54.4 kg)   05/10/17 120 lb (54.4 kg)   17 120 lb (54.4 kg)              Today, you had the following     No orders found for display       Primary Care Provider Office Phone # Fax #    DAYANNA Goodman [phone]       [facility] [address] [place] [address]        Thank you!     Thank you for choosing [facility]  for your care. Our goal is always to provide you with excellent care. Hearing back from our patients is one way we can continue to improve our services. Please take a few minutes to complete the written survey that you may receive in the mail after your visit with us. Thank you!             Your Updated Medication List - Protect others around you: Learn how to safely use, store and throw away your medicines at www.disposemymeds.org.          This list is accurate as of: 17 11:59 PM.  Always use your most recent med list.                   Brand Name Dispense Instructions for use    ALPRAZolam 0.5 MG tablet    XANAX    15 tablet    Take 1 tablet (0.5 mg) by mouth 2 times daily as needed for anxiety       clindamycin 1 % topical gel    CLINDAMAX    60 g    Apply " topically 2 times daily       cyclobenzaprine 10 MG tablet    FLEXERIL    30 tablet    Take 0.5-1 tablets (5-10 mg) by mouth 3 times daily as needed for muscle spasms       escitalopram 5 MG tablet    LEXAPRO    30 tablet    TAKE 1 TABLET BY MOUTH DAILY       etonogestrel 68 MG Impl    IMPLANON/NEXPLANON     1 each (68 mg) by Subdermal route continuous       fluticasone 50 MCG/ACT spray    FLONASE    1 Bottle    Spray 1 spray into both nostrils daily       meclizine 25 MG tablet    ANTIVERT    30 tablet    Take 1 tablet (25 mg) by mouth every 6 hours as needed for dizziness       polyethylene glycol powder    MIRALAX    510 g    Take 17 g (1 capful) by mouth daily As needed for irritable bowel sx.  If no bm in 2 day take one scoop       traZODone 50 MG tablet    DESYREL    30 tablet    TAKE 1 TABLET(50 MG) BY MOUTH EVERY NIGHT AS NEEDED FOR SLEEP

## 2017-05-16 NOTE — NURSING NOTE
"Chief Complaint   Patient presents with     Derm Problem     Sebaceous Cyst       Initial /60 (BP Location: Right arm, Patient Position: Chair, Cuff Size: Adult Regular)  Pulse 80  Temp 96.6  F (35.9  C) (Tympanic)  Ht 1.6 m (5' 3\")  Wt 54.4 kg (120 lb)  BMI 21.26 kg/m2 Estimated body mass index is 21.26 kg/(m^2) as calculated from the following:    Height as of this encounter: 1.6 m (5' 3\").    Weight as of this encounter: 54.4 kg (120 lb).  Medication Reconciliation: complete   PARAMJIT POSADAS      "

## 2017-05-22 ENCOUNTER — TELEPHONE (OUTPATIENT)
Dept: BEHAVIORAL HEALTH | Facility: OTHER | Age: 32
End: 2017-05-22

## 2017-05-22 NOTE — TELEPHONE ENCOUNTER
[facility]  @FLOW([id number])@      Social Work Care Navigator Note      Patient: Kina Pond  Date: May 22, 2017  Preferred Name: Kina    Previous PHQ-9:   PHQ-9 SCORE 1/18/2017 4/14/2017 5/4/2017   Total Score - - -   Total Score 14 14 17     Previous DANIEL-7:   DANIEL-7 SCORE 1/18/2017 4/14/2017 5/4/2017   Total Score 20 15 17     ACE LEVEL:  ACE Score (Last Two) 11/10/2016 12/1/2016   ACE Raw Score 28 32   Activation Score 50 62.6   ACE Level 2 3       Preferred Contact: @JACOB([id number])@  Type of Contact Today: Phone call (patient / identified key support person reached)      Data: (subjective / Objective):  Patient Goals Areas: @FLOW([id number])@  Patient Stated Goals: @FLOW([id number])@  Recent ED/IP Admission or Discharge?   None  Rescheduled to 5/26/17        Next 5 appointments (look out 90 days)     May 26, 2017 11:00 AM CDT   (Arrive by 10:45 AM)   Return Visit with Shannan Hirsch, [facility] ([facility])    [address] [place] [address] [phone]

## 2017-05-23 NOTE — PROGRESS NOTES
Treatment Plan    Client's Name: Kina Pond  YOB: 1985    Date: May 2, 2017    DSM-V Diagnoses:   300.01 (F41.0) Panic Disorder  300.02 (F41.1) Generalized Anxiety Disorder with depressed features    DA Date: 12/1/16  ([facility]ly [facility] DA)  Psychosocial / Contextual Factors:   health issues, limited social support, mental health symptoms, occupational / vocational stress, parent-child stress and relationship stress    Referral / Collaboration:  Referral to another professional/service is not indicated at this time.    Services to be provided/Interventions:   Interventions will be to alleviate anxiety, increase coping skills, teach CBT skills and teach mindfulness skills.    Functional Impairment:   Occupational / Vocational - Unable to hold a job right now, but working on putting in 10-20 hours of volunteer work.  Social / Relational - minimal interactions with others.    Anticipated number of session: 6      MeasurableTreatment Goal(s) related to diagnosis / functional impairment(s)  Goal 1: Client will report a reduction in depressed mood 5 sessions out of 6.     Objective A (Client Action)                  Client will learn 5 CBT skills to use for reducing depressed mood.     Objective B  Client will report using at least 2 CBT skills 5 days out of 7.     Objective C  Client will learn and use mindfulness skills 5 days out of 7.     Goal 2: Client will report a decrease in anxiety 5 out of 6 sessions.     Objective A (Client Action)                  Client will learn 5 relaxation skills.     Objective B  Client will report using relaxations skills 5 days out of 7.    Christine Clifford [facility]

## 2017-05-24 ASSESSMENT — PATIENT HEALTH QUESTIONNAIRE - PHQ9: SUM OF ALL RESPONSES TO PHQ QUESTIONS 1-9: 15

## 2017-05-24 ASSESSMENT — ANXIETY QUESTIONNAIRES: GAD7 TOTAL SCORE: 18

## 2017-05-24 NOTE — PROGRESS NOTES
Progress Note    Client Name: Kina Pond  Date: May 2, 2017         Service Type: Individual      Session Start Time: 1:30  Session End Time: 2:30      Session Length: 60     Session #: 1     Attendees: Spouse / Significant Other     DSM-V Diagnoses:   300.01 (F41.0) Panic Disorder  300.02 (F41.1) Generalized Anxiety Disorder with depressed features                                           DA Date: 12/1/16  ([facility] DA)    Treatment Plan Due: 8/2/17  PHQ-9 :   PHQ-9 SCORE 4/14/2017 5/2/2017 5/4/2017   Total Score - - -   Total Score 14 15 17      DANIEL-7 :    DANIEL-7 SCORE 4/14/2017 5/2/2017 5/4/2017   Total Score 15 18 17           DATA      Progress Since Last Session (Related to Symptoms / Goals / Homework):   Symptoms: n/a    Homework: n/a     Current / Ongoing Stressors and Concerns:   Kina explained that she experiences intense symptoms of anxiety that impair her functioning in many different areas of life. Such as work, social activities, and vocational activities.      Treatment Objective(s) Addressed in This Session:   Objective A (Client Action)                  Client will learn 5 relaxation skills.     Intervention:   Worked with patient on rapport building. Provided psycho-education on relaxation skills and logical anxiety reasoning.      Response to Interventions:   Kina responded well to this information.     ASSESSMENT: Current Emotional / Mental Status (status of significant symptoms):   Risk status (Self / Other harm or suicidal ideation)   Client denies current fears or concerns for personal safety.   Client reports the following current or recent suicidal ideation or behaviors: she reports fleeting thoughts she would be better off dead, denies any plan or intent.   Client denies current or recent homicidal ideation or behaviors.   Client denies current or recent self injurious behavior or ideation.   Client denies other safety concerns.   A safety and  risk management plan has not been developed at this time, however client was given the after-hours number / 911 should there be a change in any of these risk factors.     Appearance:   Appropriate    Eye Contact:   Good    Psychomotor Behavior: Normal    Attitude:   Cooperative    Orientation:   All   Speech    Rate / Production: Normal     Volume:  Normal    Mood:    Anxious    Affect:    Appropriate    Thought Content:  Clear    Thought Form:  Coherent  Circumstantial   Insight:    Fair         Medication Compliance:   No     Changes in Health Issues:   None reported     Chemical Use Review:   Substance Use: Chemical use reviewed, no active concerns identified      Collateral Reports Completed:   Not Applicable    PLAN: (Client Tasks / Therapist Tasks / Other)  Kina was asked to return for follow up.    RAJWINDER CrowleySW

## 2017-06-10 DIAGNOSIS — F33.1 MODERATE EPISODE OF RECURRENT MAJOR DEPRESSIVE DISORDER (H): ICD-10-CM

## 2017-06-13 RX ORDER — ESCITALOPRAM OXALATE 5 MG/1
TABLET ORAL
Qty: 30 TABLET | Refills: 2 | Status: SHIPPED | OUTPATIENT
Start: 2017-06-13 | End: 2017-06-22 | Stop reason: DRUGHIGH

## 2017-06-14 ENCOUNTER — TELEPHONE (OUTPATIENT)
Dept: FAMILY MEDICINE | Facility: OTHER | Age: 32
End: 2017-06-14

## 2017-06-14 ENCOUNTER — THERAPY VISIT (OUTPATIENT)
Dept: SLEEP MEDICINE | Facility: HOSPITAL | Age: 32
End: 2017-06-14
Attending: INTERNAL MEDICINE
Payer: COMMERCIAL

## 2017-06-14 DIAGNOSIS — G47.419 NARCOLEPSY WITHOUT CATAPLEXY(347.00): ICD-10-CM

## 2017-06-14 DIAGNOSIS — F33.1 MODERATE EPISODE OF RECURRENT MAJOR DEPRESSIVE DISORDER (H): Primary | ICD-10-CM

## 2017-06-14 PROCEDURE — 95810 POLYSOM 6/> YRS 4/> PARAM: CPT | Mod: 26 | Performed by: INTERNAL MEDICINE

## 2017-06-14 PROCEDURE — 95805 MULTIPLE SLEEP LATENCY TEST: CPT

## 2017-06-14 PROCEDURE — 95805 MULTIPLE SLEEP LATENCY TEST: CPT | Mod: 26 | Performed by: INTERNAL MEDICINE

## 2017-06-14 PROCEDURE — 95811 POLYSOM 6/>YRS CPAP 4/> PARM: CPT

## 2017-06-14 RX ORDER — ESCITALOPRAM OXALATE 10 MG/1
10 TABLET ORAL DAILY
Qty: 30 TABLET | Refills: 1 | Status: SHIPPED | OUTPATIENT
Start: 2017-06-14 | End: 2017-10-05

## 2017-06-14 NOTE — MR AVS SNAPSHOT
"              After Visit Summary   6/14/2017    Kina Pond    MRN: [id number]           Patient Information     Date Of Birth          1985        Visit Information        Provider Department      6/14/2017 7:30 PM [facility]        Today's Diagnoses     Narcolepsy without cataplexy           Follow-ups after your visit        Your next 10 appointments already scheduled     Jun 22, 2017 11:45 AM CDT   (Arrive by 11:30 AM)   SHORT with DAYANNA Goodman   [facility] [place] ([facility] [place] )    [address]  [place] [address]   [phone]            Jul 24, 2017  1:45 PM CDT   (Arrive by 1:30 PM)   PROCEDURE with Katrin Cifuentes MD   [facility] [place] ([facility] [place] )    [address]  [place] [address]   [phone]              Who to contact     If you have questions or need follow up information about today's clinic visit or your schedule please contact [facility] directly at [phone].  Normal or non-critical lab and imaging results will be communicated to you by Shoutitouthart, letter or phone within 4 business days after the clinic has received the results. If you do not hear from us within 7 days, please contact the clinic through Shoutitouthart or phone. If you have a critical or abnormal lab result, we will notify you by phone as soon as possible.  Submit refill requests through [employer] or call your pharmacy and they will forward the refill request to us. Please allow 3 business days for your refill to be completed.          Additional Information About Your Visit        MyChart Information     [employer] lets you send messages to your doctor, view your test results, renew your prescriptions, schedule appointments and more. To sign up, go to [email or web address] . Click on \"Log in\" on the left side of the screen, which will take you to the Welcome page. Then click on \"Sign up Now\" on the right side of the page. "     You will be asked to enter the access code listed below, as well as some personal information. Please follow the directions to create your username and password.     Your access code is: [email or web address]  Expires: 2017  9:24 AM     Your access code will  in 90 days. If you need help or a new code, please call your [place] clinic or [phone].        Care EveryWhere ID     This is your Care EveryWhere ID. This could be used by other organizations to access your [place] medical records  [id number]         Blood Pressure from Last 3 Encounters:   17 110/60   05/10/17 112/62   17 110/64    Weight from Last 3 Encounters:   17 120 lb (54.4 kg)   05/10/17 120 lb (54.4 kg)   17 120 lb (54.4 kg)              We Performed the Following     Comprehensive Sleep Study     SLEEP STUDY - HIM SCAN          Today's Medication Changes          These changes are accurate as of: 17 11:59 PM.  If you have any questions, ask your nurse or doctor.               These medicines have changed or have updated prescriptions.        Dose/Directions    * escitalopram 5 MG tablet   Commonly known as:  LEXAPRO   This may have changed:  Another medication with the same name was added. Make sure you understand how and when to take each.   Used for:  Moderate episode of recurrent major depressive disorder (H)   Changed by:  Makayla Khoury PA        TAKE 1 TABLET BY MOUTH DAILY   Quantity:  30 tablet   Refills:  0       * escitalopram 5 MG tablet   Commonly known as:  LEXAPRO   This may have changed:  Another medication with the same name was added. Make sure you understand how and when to take each.   Used for:  Moderate episode of recurrent major depressive disorder (H)   Changed by:  Makayla Khoury PA        TAKE 1 TABLET BY MOUTH DAILY   Quantity:  30 tablet   Refills:  2       * escitalopram 10 MG tablet   Commonly known as:  LEXAPRO   This may have changed:  You were already taking a  medication with the same name, and this prescription was added. Make sure you understand how and when to take each.   Used for:  Moderate episode of recurrent major depressive disorder (H)   Changed by:  Makayla Khoury PA        Dose:  10 mg   Take 1 tablet (10 mg) by mouth daily   Quantity:  30 tablet   Refills:  1       * Notice:  This list has 3 medication(s) that are the same as other medications prescribed for you. Read the directions carefully, and ask your doctor or other care provider to review them with you.         Where to get your medicines      These medications were sent to [id number] - [place] DOE - [address] AT [facility] [place] [address], [place] [address]     Phone:  [phone]     escitalopram 10 MG tablet                Primary Care Provider Office Phone # Fax #    DAYANNA Goodman [phone]       [facility] [address] [facility] [address]  [place] [address]        Thank you!     Thank you for choosing [facility]  for your care. Our goal is always to provide you with excellent care. Hearing back from our patients is one way we can continue to improve our services. Please take a few minutes to complete the written survey that you may receive in the mail after your visit with us. Thank you!             Your Updated Medication List - Protect others around you: Learn how to safely use, store and throw away your medicines at www.disposemymeds.org.          This list is accurate as of: 6/14/17 11:59 PM.  Always use your most recent med list.                   Brand Name Dispense Instructions for use    ALPRAZolam 0.5 MG tablet    XANAX    15 tablet    Take 1 tablet (0.5 mg) by mouth 2 times daily as needed for anxiety       clindamycin 1 % topical gel    CLINDAMAX    60 g    Apply topically 2 times daily       cyclobenzaprine 10 MG tablet    FLEXERIL    30 tablet    Take 0.5-1 tablets (5-10 mg) by mouth 3 times daily as needed for  muscle spasms       * escitalopram 5 MG tablet    LEXAPRO    30 tablet    TAKE 1 TABLET BY MOUTH DAILY       * escitalopram 5 MG tablet    LEXAPRO    30 tablet    TAKE 1 TABLET BY MOUTH DAILY       * escitalopram 10 MG tablet    LEXAPRO    30 tablet    Take 1 tablet (10 mg) by mouth daily       etonogestrel 68 MG Impl    IMPLANON/NEXPLANON     1 each (68 mg) by Subdermal route continuous       fluticasone 50 MCG/ACT spray    FLONASE    1 Bottle    Spray 1 spray into both nostrils daily       meclizine 25 MG tablet    ANTIVERT    30 tablet    Take 1 tablet (25 mg) by mouth every 6 hours as needed for dizziness       polyethylene glycol powder    MIRALAX    510 g    Take 17 g (1 capful) by mouth daily As needed for irritable bowel sx.  If no bm in 2 day take one scoop       traZODone 50 MG tablet    DESYREL    30 tablet    TAKE 1 TABLET(50 MG) BY MOUTH EVERY NIGHT AS NEEDED FOR SLEEP       * Notice:  This list has 3 medication(s) that are the same as other medications prescribed for you. Read the directions carefully, and ask your doctor or other care provider to review them with you.

## 2017-06-14 NOTE — TELEPHONE ENCOUNTER
Last visit 5/4/17.  Escitalopram 5 mg just filled #30, 2 R (take 1 tab by mouth daily).  Pharmacy requesting Escitalopram 10 mg (take 1 tab by mouth daily).  No medication pended.  Please advise.  Thanks

## 2017-06-15 ENCOUNTER — TELEPHONE (OUTPATIENT)
Dept: BEHAVIORAL HEALTH | Facility: OTHER | Age: 32
End: 2017-06-15

## 2017-06-15 NOTE — NURSING NOTE
Patient is here with a complaint of EDS. I reviewed the data from her overnight test and she did not have sleep disordered breathing her index was only 0.3 and her low SPO2 was 94%. She had no snoring or leg movements. The only abnormality noted is that she has some bruxism through out the test most noticeable in REM. Patient tolerated test well and stayed for the MSLT.

## 2017-06-15 NOTE — TELEPHONE ENCOUNTER
"[facility]  @FLOW([id number])@      Social Work Care Navigator Note      Patient: Kina Pond  Date: Debra 15, 2017  Preferred Name: Kina    Previous PHQ-9:   PHQ-9 SCORE 4/14/2017 5/2/2017 5/4/2017   Total Score - - -   Total Score 14 15 17     Previous DANIEL-7:   DANIEL-7 SCORE 4/14/2017 5/2/2017 5/4/2017   Total Score 15 18 17     ACE LEVEL:  ACE Score (Last Two) 11/10/2016 12/1/2016   ACE Raw Score 28 32   Activation Score 50 62.6   ACE Level 2 3       Preferred Contact: @JACOB([id number]@  Type of Contact Today: Phone call (patient / identified key support person reached)      Data: (subjective / Objective):  Patient Goals Areas: @FLOW([id number])@  Patient Stated Goals: @FLOW([id number])@  Recent ED/IP Admission or Discharge?   None      Patient Goals:  Goal Areas: Mental Health  Patient stated goals: \" Take my kids to the Easter Egg hunt instead of sleeping\"  I can be outside with my kids 10 min a day.    Made appointment for tomorrow for face to face. Patient has no-showed last two appts.    Next 5 appointments (look out 90 days)     Jun 16, 2017  9:00 AM CDT   (Arrive by 8:45 AM)   Return Visit with MIREILLE Scales   [facility] ([facility] )    [address] [place] [id number] [phone]                "

## 2017-06-15 NOTE — PROGRESS NOTES
The Pt was identified by name and  as well as wristband.  She is a 30 yo female with c/o EDS. Sleep testing and possible findings were discussed during hook up.  ROSANA and CPAP therapy were discussed but no mask fitting was done.  Sleep onset was rapid. She did have frequent arousals from varied etiology.  Although her snoring was light at best, there appeared to be some snore arousals.  There were rare apneas noted.  She also had some PLMs with arousals.  The SpO2 baseline in sleep was 96%.  The lowest SpO2 was 94%.  No arrhythmias were noted.  She had prolonged REM latency and her REM sleep was fractured, again with varied etiology.  There was some bruxism noted in REM. The preliminary results were discussed.  She reports sleeping better than usual.  She remains in the lab for MSLT.

## 2017-06-16 ENCOUNTER — OFFICE VISIT (OUTPATIENT)
Dept: BEHAVIORAL HEALTH | Facility: OTHER | Age: 32
End: 2017-06-16
Payer: COMMERCIAL

## 2017-06-16 DIAGNOSIS — F48.9 DEFERRED DIAGNOSIS ON AXIS I: Primary | ICD-10-CM

## 2017-06-16 NOTE — PROGRESS NOTES
"[facility]         Social Work Care Navigator Note      Patient: Kina Pond  Date: 2017  Preferred Name: Kina    Previous PHQ-9:   PHQ-9 SCORE 2017   Total Score - - -   Total Score 14 15 17     Previous DANIEL-7:   DANIEL-7 SCORE 2017   Total Score 15 18 17     ACE LEVEL:  ACE Score (Last Two) 11/10/2016 2016   ACE Raw Score 28 32   Activation Score 50 62.6   ACE Level 2 3       Preferred Contact: @[facility]([id number])@  Type of Contact Today: Face to Face in Clinic      Data: (subjective / Objective):  Patient Goals Areas: Goal Areas: Education  Patient Stated Goals: Patient stated goals: \" I will place a calander inside my contact case case to look at first thing in the morning\"  Recent ED/IP Admission or Discharge?   None  Recent ED/IP Admission or Discharge?   None    Patient Goals:  Goal Areas: Education  Patient stated goals: \" I will place a calander inside my contact case case to look at first thing in the morning\"      [facility] Core Service Provided:  Comprehensive Care Management: utilized the electronic medical record / patient registry to identify and support patient's health conditions / needs more effectively   Care Transitions: focused on the coordinated and seamless movement of patient between or within different levels of care or settings  Care Coordination: provided care management services/referrals necessary to ensure patient and their identified supports have access to medical, behavioral health, pharmacology and recovery support services.  Ensured that patient's care is integrated across all settings and services.   Health and Wellness Promotion    Current Stressors / Issues / Care Plan Objective Addressed Today:  Grandmother - she feels guilt  Sleeps \"all the time-little REM sleep.\"  Does not take medicines as prescribed    Intervention:  Motivational Interviewing: Expressed Empathy/Understanding, Supported Autonomy, " Collaboration, Evocation, Permission to raise concern or advise, Open-ended questions and Reflections: simple and complex   Target Behavior(s): Explored thoughts about taking an anti-depressant    Assessment: (Progress on Goals / Homework):  Changed goal- To look at a calendar first thing in the morning    Plan: (Homework, other):  Patient was encouraged to continue to seek condition-related information and education.      Scheduled a Phone follow up appointment with TAMEKA SAN in 2 weeks     Patient has set self-identified goals and will monitor progress until the next appointment on: 6/30/17.     Shannan Hirsch, Social Work Care Coordinator

## 2017-06-16 NOTE — LETTER
"Behavioral Health Home ([facility]): Health Action Plan  [facility] Clinic: [facility]  [facility]      Name: Kina Pond  Preferred Name: Kina  : 1985  MRN: [id number]    How to Contact me  Need for : No  Preferred Contact: Cell    Home Phone [phone]   Mobile [phone]     Ok to contact me by email?* No   *Signed & Scanned Consent form Required*   No e-mail address on record  Name and contact of soares supports (eg. Radha (mom) Phone number):  Trav      My Goals  Goal Areas: Education  Patient stated goals: \" I will place a calander inside my contact case case to look at first thing in the morning\"  Strengths related to each goal: Has a calender  Services and Supports Needed: Reminders  Activities / Actions of Team to support goal(s): Help with cheerleeding  Activities / Actions of Patient / Parent / Guardian to support goal(s): write things on the calender      Recommended Referral  Tobacco cessation referrals made?: Yes (see comments)  Mental Health / Chemical Dependency Referrals: Yes  Substance Use Referrals: Behavioral Health Clinician  Mental Health Referrals: MH Services: [facility], [facility], Community  Provider          My Team Members and Their Contact Information  Patient Care Team       Relationship Specialty Notifications Start End    Makayla Khoury, PA PCP - General   5/15/13     Phone: [phone] Fax: [phone]         [facility] [address] [place] [address]    Shannan Hirsch, SW   Abnormal results only, Admissions 16     [facility]amela Peters, [facility] Licensed Mental Health Abnormal results only, Admissions 16     Phone: [phone]          [facility] [address] [place] [address]          My Wellness Plan  Recommendations / Plan for safety concerns: N/A  Crisis Plan (emergencies / when urgent support needed): Kina carloz with depression by sleeping and this happens \"no more than 3x a month\";  (She " has gone to ED in the past. Kina knows to call 911, ED)        Kina Pond co-developed the Health Action Plan with the [facility] Team and received a copy of this document.  Date Health Action Plan Completed/Updated: 06/16/17

## 2017-06-16 NOTE — NURSING NOTE
Patient had 4 naps two hours apart. Her mean sleep latency was about 8 minutes. The test was discontinued after the 4th nap because the patient had a bad headache that was causing her to be sick and disturbed her sleep. She tolerated the est fair and will wait to hear from Dr. Kc.

## 2017-06-20 NOTE — PROGRESS NOTES
30 y/o referred by Makayla Khoury for excessive daytime somnolence.   Overnight 18 channel polysomnography was done 6/14/17, I reviewed the raw data in detail.Please see scanned sleep study for full results.Sleep efficiency was normal with a normal sleep latency and a prolonged REM latency. Sleep architecture shows all stages seen in usual amounts for age. Baseline oxyhemoglobin saturation was 97%. The ECG was monitored and no arrhythmias were seen. There were no significant periodic leg movements noted.              Technician noted no snoring, minimal sleep disordered breathing, AHI of less than 1.                        Pt stayed for an MSLT, 4 naps acquired( had a migraine HA that shortened the study), sleep latency shortened to 8.6 min, no SOREMs.     Impression: c/w non Rem narcolepsy, trial stimulants.

## 2017-06-21 ENCOUNTER — OFFICE VISIT (OUTPATIENT)
Dept: SLEEP MEDICINE | Facility: HOSPITAL | Age: 32
End: 2017-06-21
Attending: INTERNAL MEDICINE
Payer: COMMERCIAL

## 2017-06-21 VITALS
DIASTOLIC BLOOD PRESSURE: 62 MMHG | OXYGEN SATURATION: 98 % | SYSTOLIC BLOOD PRESSURE: 98 MMHG | HEART RATE: 80 BPM | RESPIRATION RATE: 12 BRPM

## 2017-06-21 DIAGNOSIS — G47.419 NARCOLEPSY WITHOUT CATAPLEXY(347.00): Primary | ICD-10-CM

## 2017-06-21 PROCEDURE — 99212 OFFICE O/P EST SF 10 MIN: CPT | Performed by: INTERNAL MEDICINE

## 2017-06-21 PROCEDURE — 99211 OFF/OP EST MAY X REQ PHY/QHP: CPT

## 2017-06-21 RX ORDER — MODAFINIL 200 MG/1
200 TABLET ORAL DAILY
Qty: 30 TABLET | Refills: 0 | Status: SHIPPED | OUTPATIENT
Start: 2017-06-21 | End: 2017-07-19

## 2017-06-21 NOTE — PROGRESS NOTES
Sleep study c/w narcolepsy, will start Provigil 200 as trial if its paid for.  BP 98/62  Pulse 80  Resp 12  SpO2 98%    A/ discussed risks/benefits for this drug, will let us know.

## 2017-06-21 NOTE — MR AVS SNAPSHOT
"              After Visit Summary   6/21/2017    Kina Pond    MRN: [id number]           Patient Information     Date Of Birth          1985        Visit Information        Provider Department      6/21/2017 9:30 AM Miguel Kc MD [facility]        Today's Diagnoses     Narcolepsy without cataplexy    -  1       Follow-ups after your visit        Your next 10 appointments already scheduled     Jun 22, 2017 11:45 AM CDT   (Arrive by 11:30 AM)   SHORT with DAYANNA Goodman   [facility] [place] ([facility] [place] )    [address]  [place] [address]   [phone]            Jul 24, 2017  1:45 PM CDT   (Arrive by 1:30 PM)   PROCEDURE with Katrin Cifuentes MD   [facility] [place] ([facility] [place] )    [address]  [place] [address]   [phone]              Who to contact     If you have questions or need follow up information about today's clinic visit or your schedule please contact [facility] directly at [phone].  Normal or non-critical lab and imaging results will be communicated to you by Ciafohart, letter or phone within 4 business days after the clinic has received the results. If you do not hear from us within 7 days, please contact the clinic through Ciafohart or phone. If you have a critical or abnormal lab result, we will notify you by phone as soon as possible.  Submit refill requests through TopChalks or call your pharmacy and they will forward the refill request to us. Please allow 3 business days for your refill to be completed.          Additional Information About Your Visit        MyChart Information     TopChalks lets you send messages to your doctor, view your test results, renew your prescriptions, schedule appointments and more. To sign up, go to [email or web address] . Click on \"Log in\" on the left side of the screen, which will take you to the Welcome page. Then click on \"Sign up Now\" on the right side of the " page.     You will be asked to enter the access code listed below, as well as some personal information. Please follow the directions to create your username and password.     Your access code is: [email or web address]  Expires: 2017  9:24 AM     Your access code will  in 90 days. If you need help or a new code, please call your [facility] clinic or [phone].        Care EveryWhere ID     This is your Care EveryWhere ID. This could be used by other organizations to access your [facility] medical records  [id number]        Your Vitals Were     Pulse Respirations Pulse Oximetry             80 12 98%          Blood Pressure from Last 3 Encounters:   17 98/62   17 110/60   05/10/17 112/62    Weight from Last 3 Encounters:   17 120 lb (54.4 kg)   05/10/17 120 lb (54.4 kg)   17 120 lb (54.4 kg)              Today, you had the following     No orders found for display         Today's Medication Changes          These changes are accurate as of: 17  9:49 AM.  If you have any questions, ask your nurse or doctor.               Start taking these medicines.        Dose/Directions    modafinil 200 MG tablet   Commonly known as:  PROVIGIL   Used for:  Narcolepsy without cataplexy        Dose:  200 mg   Take 1 tablet (200 mg) by mouth daily   Quantity:  30 tablet   Refills:  0            Where to get your medicines      Some of these will need a paper prescription and others can be bought over the counter.  Ask your nurse if you have questions.     Bring a paper prescription for each of these medications     modafinil 200 MG tablet                Primary Care Provider Office Phone # Fax #    DAYANNA Goodman [phone]       [facility] [address] [address]        Equal Access to Services     MANJIT HERNANDEZ AH: Carolin Louis, waaxda luqadaha, qaybta kaalmada mihaelayaricardo, alton miranda. So [facility]  [phone].    ATENCIÓN: Si jeff araiza, tiene a quiroga disposición servicios gratuitos de asistencia lingüística. Liza marquez [phone].    We comply with applicable federal civil rights laws and [place] laws. We do not discriminate on the basis of race, color, national origin, age, disability sex, sexual orientation or gender identity.            Thank you!     Thank you for choosing [facility]  for your care. Our goal is always to provide you with excellent care. Hearing back from our patients is one way we can continue to improve our services. Please take a few minutes to complete the written survey that you may receive in the mail after your visit with us. Thank you!             Your Updated Medication List - Protect others around you: Learn how to safely use, store and throw away your medicines at www.disposemymeds.org.          This list is accurate as of: 6/21/17  9:49 AM.  Always use your most recent med list.                   Brand Name Dispense Instructions for use Diagnosis    ALPRAZolam 0.5 MG tablet    XANAX    15 tablet    Take 1 tablet (0.5 mg) by mouth 2 times daily as needed for anxiety    Anxiety attack, Major depressive disorder, recurrent episode, moderate (H)       clindamycin 1 % topical gel    CLINDAMAX    60 g    Apply topically 2 times daily    Cystic acne       cyclobenzaprine 10 MG tablet    FLEXERIL    30 tablet    Take 0.5-1 tablets (5-10 mg) by mouth 3 times daily as needed for muscle spasms    Chronic bilateral low back pain with left-sided sciatica       * escitalopram 5 MG tablet    LEXAPRO    30 tablet    TAKE 1 TABLET BY MOUTH DAILY    Moderate episode of recurrent major depressive disorder (H)       * escitalopram 5 MG tablet    LEXAPRO    30 tablet    TAKE 1 TABLET BY MOUTH DAILY    Moderate episode of recurrent major depressive disorder (H)       * escitalopram 10 MG tablet    LEXAPRO    30 tablet    Take 1 tablet (10 mg) by mouth daily    Moderate episode of recurrent  major depressive disorder (H)       etonogestrel 68 MG Impl    IMPLANON/NEXPLANON     1 each (68 mg) by Subdermal route continuous    Nexplanon insertion       fluticasone 50 MCG/ACT spray    FLONASE    1 Bottle    Spray 1 spray into both nostrils daily        meclizine 25 MG tablet    ANTIVERT    30 tablet    Take 1 tablet (25 mg) by mouth every 6 hours as needed for dizziness        modafinil 200 MG tablet    PROVIGIL    30 tablet    Take 1 tablet (200 mg) by mouth daily    Narcolepsy without cataplexy       polyethylene glycol powder    MIRALAX    510 g    Take 17 g (1 capful) by mouth daily As needed for irritable bowel sx.  If no bm in 2 day take one scoop    Abdominal pain, epigastric       traZODone 50 MG tablet    DESYREL    30 tablet    TAKE 1 TABLET(50 MG) BY MOUTH EVERY NIGHT AS NEEDED FOR SLEEP    Transient insomnia       * Notice:  This list has 3 medication(s) that are the same as other medications prescribed for you. Read the directions carefully, and ask your doctor or other care provider to review them with you.

## 2017-06-22 ENCOUNTER — OFFICE VISIT (OUTPATIENT)
Dept: FAMILY MEDICINE | Facility: OTHER | Age: 32
End: 2017-06-22
Attending: PHYSICIAN ASSISTANT
Payer: COMMERCIAL

## 2017-06-22 ENCOUNTER — OFFICE VISIT (OUTPATIENT)
Dept: BEHAVIORAL HEALTH | Facility: OTHER | Age: 32
End: 2017-06-22
Payer: COMMERCIAL

## 2017-06-22 VITALS
DIASTOLIC BLOOD PRESSURE: 58 MMHG | WEIGHT: 122 LBS | HEART RATE: 78 BPM | OXYGEN SATURATION: 98 % | BODY MASS INDEX: 20.83 KG/M2 | HEIGHT: 64 IN | TEMPERATURE: 98.2 F | SYSTOLIC BLOOD PRESSURE: 100 MMHG

## 2017-06-22 DIAGNOSIS — Z71.89 ACP (ADVANCE CARE PLANNING): Chronic | ICD-10-CM

## 2017-06-22 DIAGNOSIS — F33.1 MODERATE EPISODE OF RECURRENT MAJOR DEPRESSIVE DISORDER (H): Primary | ICD-10-CM

## 2017-06-22 DIAGNOSIS — F48.9 DEFERRED DIAGNOSIS ON AXIS I: Primary | ICD-10-CM

## 2017-06-22 PROCEDURE — 99212 OFFICE O/P EST SF 10 MIN: CPT

## 2017-06-22 PROCEDURE — 99214 OFFICE O/P EST MOD 30 MIN: CPT | Performed by: PHYSICIAN ASSISTANT

## 2017-06-22 ASSESSMENT — ANXIETY QUESTIONNAIRES
2. NOT BEING ABLE TO STOP OR CONTROL WORRYING: NEARLY EVERY DAY
6. BECOMING EASILY ANNOYED OR IRRITABLE: MORE THAN HALF THE DAYS
IF YOU CHECKED OFF ANY PROBLEMS ON THIS QUESTIONNAIRE, HOW DIFFICULT HAVE THESE PROBLEMS MADE IT FOR YOU TO DO YOUR WORK, TAKE CARE OF THINGS AT HOME, OR GET ALONG WITH OTHER PEOPLE: SOMEWHAT DIFFICULT
1. FEELING NERVOUS, ANXIOUS, OR ON EDGE: MORE THAN HALF THE DAYS
7. FEELING AFRAID AS IF SOMETHING AWFUL MIGHT HAPPEN: NEARLY EVERY DAY
GAD7 TOTAL SCORE: 15
3. WORRYING TOO MUCH ABOUT DIFFERENT THINGS: NEARLY EVERY DAY
5. BEING SO RESTLESS THAT IT IS HARD TO SIT STILL: SEVERAL DAYS

## 2017-06-22 ASSESSMENT — PAIN SCALES - GENERAL: PAINLEVEL: NO PAIN (0)

## 2017-06-22 ASSESSMENT — PATIENT HEALTH QUESTIONNAIRE - PHQ9: 5. POOR APPETITE OR OVEREATING: SEVERAL DAYS

## 2017-06-22 NOTE — PROGRESS NOTES
SUBJECTIVE:                                                    Kina Pond is a 31 year old female who presents to clinic today for the following health issues:        Depression and Anxiety Follow-Up    Status since last visit: No change    Other associated symptoms:Sleeps alot    Complicating factors: none    Significant life event: Yes-  Grandmother passed away 2 weeks ago. She feels some guilt because she use to live with her grandma and help her with her cares.      Current substance abuse: None    Would like to get a  animal for her anxiety.    PHQ-9 SCORE 2017   Total Score - - -   Total Score 14 15 17     DANIEL-7 SCORE 2017   Total Score 15 18 17        PHQ-9  English      PHQ-9   Any Language     GAD7       Amount of exercise or physical activity: 1 day/week for an average of greater than 60 minutes    Problems taking medications regularly: Yes stopping and starting because of forgetting and depression    Medication side effects: Yes, still having problems urinating     Diet: regular (no restrictions)    Problem list and histories reviewed & adjusted, as indicated.  Additional history: as documented    Patient Active Problem List   Diagnosis     Chemical dependency (H)     Major depression     ACP (advance care planning)     Cystic acne     Past Surgical History:   Procedure Laterality Date     BREAST SURGERY  2013    right lumpectomy     C ANESTH,VAGINAL DELIVERY  2012    Pregnancy full-term; 4.00 hr labor ; APGAR:9 (1 min) 9 (5 min)  (10 min)  -1st degree perineal laceration - Pitocin     C ANESTH,VAGINAL DELIVERY      Pregnancy; 12 hr labor ; 40 week 6 lb(s) 12 oz Male     C ANESTH,VAGINAL DELIVERY      Pregnancy; , 34 week 4 lb(s) 12 oz Male; in hospital for 1 week, ruptured membranes, resealed ([place], [place])     C INDUCED ABORTN BY DIL/EVAC      Pregnancy, induced  due to birth defect; unknown sex        CHOLECYSTECTOMY  2008       Social History   Substance Use Topics     Smoking status: Current Every Day Smoker     Packs/day: 0.50     Years: 20.00     Types: Cigarettes     Last attempt to quit: 4/4/2016     Smokeless tobacco: Never Used      Comment: cutting down to 4-5 cig daily      Alcohol use Yes      Comment: rare     Family History   Problem Relation Age of Onset     Neurologic Disorder Mother      ALS     Respiratory Father      COPD     C.A.D. Father      Family H/O      Prostate Cancer Father      Psychotic Disorder Brother      Breast Cancer Sister      Asthma Other          Current Outpatient Prescriptions   Medication Sig Dispense Refill     modafinil (PROVIGIL) 200 MG tablet Take 1 tablet (200 mg) by mouth daily 30 tablet 0     escitalopram (LEXAPRO) 10 MG tablet Take 1 tablet (10 mg) by mouth daily 30 tablet 1     traZODone (DESYREL) 50 MG tablet TAKE 1 TABLET(50 MG) BY MOUTH EVERY NIGHT AS NEEDED FOR SLEEP 30 tablet 3     meclizine (ANTIVERT) 25 MG tablet Take 1 tablet (25 mg) by mouth every 6 hours as needed for dizziness 30 tablet 1     fluticasone (FLONASE) 50 MCG/ACT spray Spray 1 spray into both nostrils daily 1 Bottle 0     etonogestrel (IMPLANON/NEXPLANON) 68 MG IMPL 1 each (68 mg) by Subdermal route continuous       polyethylene glycol (MIRALAX) powder Take 17 g (1 capful) by mouth daily As needed for irritable bowel sx.  If no bm in 2 day take one scoop 510 g 1     [DISCONTINUED] escitalopram (LEXAPRO) 5 MG tablet TAKE 1 TABLET BY MOUTH DAILY 30 tablet 2     [DISCONTINUED] escitalopram (LEXAPRO) 5 MG tablet TAKE 1 TABLET BY MOUTH DAILY 30 tablet 0     ALPRAZolam (XANAX) 0.5 MG tablet Take 1 tablet (0.5 mg) by mouth 2 times daily as needed for anxiety (Patient not taking: Reported on 6/22/2017) 15 tablet 0     clindamycin (CLINDAMAX) 1 % topical gel Apply topically 2 times daily (Patient not taking: Reported on 6/22/2017) 60 g 11     cyclobenzaprine (FLEXERIL) 10 MG tablet Take 0.5-1 tablets  "(5-10 mg) by mouth 3 times daily as needed for muscle spasms (Patient not taking: Reported on 6/22/2017) 30 tablet 1     No Known Allergies  BP Readings from Last 3 Encounters:   06/22/17 100/58   06/21/17 98/62   05/16/17 110/60    Wt Readings from Last 3 Encounters:   06/22/17 122 lb (55.3 kg)   05/16/17 120 lb (54.4 kg)   05/10/17 120 lb (54.4 kg)         Tobacco  Allergies  Meds  Med Hx  Surg Hx  Fam Hx  Soc Hx      Reviewed and updated as needed this visit by Provider    ROS:  Constitutional, HEENT, cardiovascular, pulmonary, GI, , musculoskeletal, neuro, skin, endocrine and psych systems are negative, except as otherwise noted.    OBJECTIVE:                                                    /58 (BP Location: Left arm, Patient Position: Chair, Cuff Size: Adult Regular)  Pulse 78  Temp 98.2  F (36.8  C) (Tympanic)  Ht 5' 4\" (1.626 m)  Wt 122 lb (55.3 kg)  SpO2 98%  BMI 20.94 kg/m2  Body mass index is 20.94 kg/(m^2).  GENERAL: healthy, alert and no distress  EYES: Eyes grossly normal to inspection, PERRL and conjunctivae and sclerae normal  HENT: ear canals and TM's normal, nose and mouth without ulcers or lesions  NECK: no adenopathy, no asymmetry, masses, or scars and thyroid normal to palpation  RESP: lungs clear to auscultation - no rales, rhonchi or wheezes  CV: regular rate and rhythm, normal S1 S2, no S3 or S4, no murmur, click or rub  MS: no gross musculoskeletal defects noted, no edema  NEURO: Normal strength and tone, mentation intact and speech normal  PSYCH: mentation appears normal, affect normal/bright    Diagnostic Test Results:  No results found for this or any previous visit (from the past 24 hour(s)).     ASSESSMENT/PLAN:                                                    1. Moderate episode of recurrent major depressive disorder (H)  We are having difficulty managing her depression as we are unsure what exactly she is taking and how often she is taking it. Referral to home " "care to help manage the medications in home setting. Rosie, , present for today's appointment and also believes that would be helpful. Pt wanting to get a \"service dog\" to help with her anxiety but this is a large dog. We discusses with her that she has to talk with the landlord to determine what the rules and regulations are regarding this. Fax number provided to patient to fax those to us. Once we know this we will be able to determine next step.   - HOME CARE NURSING REFERRAL    2. ACP (advance care planning)  Addressed this with patient today.    Follow-up as indicated on your patient instructions or sooner if needed. On your way out today, schedule an exam so we can take care of other preventative screens like your pap smear.     FABIOLA Woodson Student saw this patient under the supervision of Makayla Khoury PA-C.     DAYANNA Cai  [facility] [place]  "

## 2017-06-22 NOTE — PROGRESS NOTES
"[facility]         Social Work Care Navigator Note      Patient: Kina Pond  Date: June 22, 2017  Preferred Name: Kina    Previous PHQ-9:   PHQ-9 SCORE 5/2/2017 5/4/2017 6/22/2017   Total Score - - -   Total Score 15 17 13     Previous DANIEL-7:   DANIEL-7 SCORE 5/2/2017 5/4/2017 6/22/2017   Total Score 18 17 15     ACE LEVEL:  ACE Score (Last Two) 11/10/2016 12/1/2016   ACE Raw Score 28 32   Activation Score 50 62.6   ACE Level 2 3       Preferred Contact: @[facility]([id number])@  Type of Contact Today: Face to Face in Clinic      Data: (subjective / Objective):  Patient Goals Areas:    Patient Stated Goals:    Recent ED/IP Admission or Discharge?   None  Recent ED/IP Admission or Discharge?   None    Patient Goals:  Goal Areas: Education  Patient stated goals: \" I will place a calander inside my contact case case to look at first thing in the morning\"      [facility] Core Service Provided:  Comprehensive Care Management: utilized the electronic medical record / patient registry to identify and support patient's health conditions / needs more effectively   Care Transitions: focused on the coordinated and seamless movement of patient between or within different levels of care or settings  Health and Wellness Promotion    Current Stressors / Issues / Care Plan Objective Addressed Today:  Kina wants a big dog to help with her anxiety- they have picked out a 100# dog at the pound HRA has waived weight restrictions if signed by a MH or PCP provider. Kina will fill out the form and bring it in, [facility]RA wants to talk to us to make sure she has talked to us.    Intervention:  Motivational Interviewing: Expressed Empathy/Understanding, Supported Autonomy, Collaboration, Evocation and Permission to raise concern or advise   Target Behavior(s): Explored and resolved challenges related to taking anti-depressants as prescribed    Assessment: (Progress on Goals / Homework):  Kina has the calendar on the kitchen table and is " checking it each morning.  Referral for Home Health nurse for medications placed    Plan: (Homework, other):  Patient was encouraged to continue to seek condition-related information and education.      Scheduled a Phone follow up appointment with TAMEKA SAN in 1 week     Patient has set self-identified goals and will monitor progress until the next appointment on: 6/29/17.     Shannan Hirsch, Social Work Care Coordinator

## 2017-06-22 NOTE — MR AVS SNAPSHOT
After Visit Summary   6/22/2017    Kina Pond    MRN: [id number]           Patient Information     Date Of Birth          1985        Visit Information        Provider Department      6/22/2017 11:45 AM Makayla Khoury PA [facility]        Today's Diagnoses     Moderate episode of recurrent major depressive disorder (H)    -  1    ACP (advance care planning)          Care Instructions      Thank you for choosing [facility].   I have office hours 8:00 am to 4:30 pm on Monday's, Wednesday's, Thursday's and Friday's. My nurse and I are out of the office every Tuesday.    Following your visit, when your labs and diagnostic testing have returned, I will review then and you will be contacted by my nurse.  If you are on My Chart, you can also view results there.    For refills, notify your pharmacy regarding what you need and the pharmacy will generate a refill request. Do not call my nurse as she is unable to process refill request. Please plan ahead and allow 3-5 days for refill requests.    You will generally receive a reminder call the day prior to your appointment.  If you cannot attend your appointment, please cancel your appointment with as much notice as possible.  If there is a pattern of failure to present for your appointments, I cannot provide consistent, meaningful, ongoing care for you. It is very important to me that you come in for your care, so we can best assist you with your health care needs.    IMPORTANT:  Please note that it is my standard of practice to NOT participate in prescribing ongoing requested Narcotic Analgesic therapy, and/or participate in the prescribing of other controlled substances.  My nurse and I am happy to assist you with the process of referral for alternative pain management as needed, and other treatment modalities including but not limited to:  Physical Therapy, Physical Medicine and Rehab, Counseling, Chiropractic Care,  Orthopedic Care, and non-narcotic medication management.     In the event that you need to be seen for emergent concerns and I am out of office,  please see one of my colleagues for acute concerns.  You may also present to UC or ER.  I appreciate the opportunity to serve you and look forward to supporting your healthcare needs in the future. Please contact me with any questions or concerns that you may have.    Sincerely,      Makayla Khoury RN, PASvetlanaC               Follow-ups after your visit        Additional Services     HOME CARE NURSING REFERRAL       **Order classes of: [facility], [facility] and [facility] will route to the Home Care and Hospice Referral Pool.  Home Care or Hospice will then contact the patient to schedule their appointment.**    If you do not hear from Home Care and Hospice, or you would like to call to schedule, please call the referring place of service that your provider has listed below.  ______________________________________________________________________    Your provider has referred you to: [facility] [place] [phone]   [email or web address]    Extended Emergency Contact Information  Primary Emergency Contact: Trav Pond  Address: [address] [place] [address] [id number] [place] [address]  Home Phone: [phone]  Mobile Phone: [phone]  Relation: Spouse  Secondary Emergency Contact: No secondary contact   [facility]  Home Phone: none  Relation: Other    Patient Anticipated Discharge Date:    RN, PT, HHA to begin 24 - 48 hours after discharge.  PLEASE EVALUATE AND TREAT (Evaluation timeline is 24 - 48 hrs. Please call if there is need for a variance to this timeline).    REASON FOR REFERRAL: home nursing for med compliance.     ADDITIONAL SERVICES NEEDED:     OTHER PERTINENT INFORMATION: Patient was last seen by provider on 6/22/17 for depression and med compliance. .    Current Outpatient  Prescriptions:  modafinil (PROVIGIL) 200 MG tablet, Take 1 tablet (200 mg) by mouth daily, Disp: 30 tablet, Rfl: 0  escitalopram (LEXAPRO) 10 MG tablet, Take 1 tablet (10 mg) by mouth daily, Disp: 30 tablet, Rfl: 1  traZODone (DESYREL) 50 MG tablet, TAKE 1 TABLET(50 MG) BY MOUTH EVERY NIGHT AS NEEDED FOR SLEEP, Disp: 30 tablet, Rfl: 3  meclizine (ANTIVERT) 25 MG tablet, Take 1 tablet (25 mg) by mouth every 6 hours as needed for dizziness, Disp: 30 tablet, Rfl: 1  fluticasone (FLONASE) 50 MCG/ACT spray, Spray 1 spray into both nostrils daily, Disp: 1 Bottle, Rfl: 0  etonogestrel (IMPLANON/NEXPLANON) 68 MG IMPL, 1 each (68 mg) by Subdermal route continuous, Disp: , Rfl:   polyethylene glycol (MIRALAX) powder, Take 17 g (1 capful) by mouth daily As needed for irritable bowel sx.  If no bm in 2 day take one scoop, Disp: 510 g, Rfl: 1  [DISCONTINUED] escitalopram (LEXAPRO) 5 MG tablet, TAKE 1 TABLET BY MOUTH DAILY, Disp: 30 tablet, Rfl: 2  [DISCONTINUED] escitalopram (LEXAPRO) 5 MG tablet, TAKE 1 TABLET BY MOUTH DAILY, Disp: 30 tablet, Rfl: 0  ALPRAZolam (XANAX) 0.5 MG tablet, Take 1 tablet (0.5 mg) by mouth 2 times daily as needed for anxiety (Patient not taking: Reported on 6/22/2017), Disp: 15 tablet, Rfl: 0  clindamycin (CLINDAMAX) 1 % topical gel, Apply topically 2 times daily (Patient not taking: Reported on 6/22/2017), Disp: 60 g, Rfl: 11  cyclobenzaprine (FLEXERIL) 10 MG tablet, Take 0.5-1 tablets (5-10 mg) by mouth 3 times daily as needed for muscle spasms (Patient not taking: Reported on 6/22/2017), Disp: 30 tablet, Rfl: 1      Patient Active Problem List:     Chemical dependency (H)     Major depression     ACP (advance care planning)     Cystic acne      Documentation of Face to Face and Certification for Home Health Services    I certify that patient, Kina Pond is under my care and that I, or a Nurse Practitioner or Physician's Assistant working with me, had a face-to-face encounter that meets the  physician face-to-face encounter requirements with this patient on: .    This encounter with the patient was in whole, or in part, for the following medical condition, which is the primary reason for Home Health Care: .    I certify that, based on my findings, the following services are medically necessary Home Health Services:     My clinical findings support the need for the above services because: Nurse is needed: To assess  after changes in medications or other medical regimen..    Further, I certify that my clinical findings support that this patient is homebound (i.e. absences from home require considerable and taxing effort and are for medical reasons or [facility] services or infrequently or of short duration when for other reasons) because: Mental health symptoms including: Mental health condition is exacerbated by exposure to crowds, unfamiliar environment or new stressful situations. and very poor insight.    Based on the above findings, I certify that this patient is confined to the home and needs intermittent skilled nursing care, physical therapy and/or speech therapy.  The patient is under my care, and I have initiated the establishment of the plan of care.  This patient will be followed by a physician who will periodically review the plan of care.    Physician/Provider to provide follow up care: Makayla Espinosa certified Physician at time of discharge: terri espinosa RN PA-C    Please be aware that coverage of these services is subject to the terms and limitations of your health insurance plan.  Call member services at your health plan with any benefit or coverage questions.                  Your next 10 appointments already scheduled     Jul 24, 2017  1:45 PM CDT   (Arrive by 1:30 PM)   PROCEDURE with Katrin Cifuentes MD   [facility] [place] ([facility] [place] )    [address] [place] [address]   [phone]              Who to contact     If you have  "questions or need follow up information about today's clinic visit or your schedule please contact [facility] directly at [phone].  Normal or non-critical lab and imaging results will be communicated to you by MyChart, letter or phone within 4 business days after the clinic has received the results. If you do not hear from us within 7 days, please contact the clinic through MyChart or phone. If you have a critical or abnormal lab result, we will notify you by phone as soon as possible.  Submit refill requests through Zomato or call your pharmacy and they will forward the refill request to us. Please allow 3 business days for your refill to be completed.          Additional Information About Your Visit        Care EveryWhere ID     This is your Care EveryWhere ID. This could be used by other organizations to access your [facility] medical records  [id number]        Your Vitals Were     Pulse Temperature Height Pulse Oximetry BMI (Body Mass Index)       78 98.2  F (36.8  C) (Tympanic) 5' 4\" (1.626 m) 98% 20.94 kg/m2        Blood Pressure from Last 3 Encounters:   06/22/17 100/58   06/21/17 98/62   05/16/17 110/60    Weight from Last 3 Encounters:   06/22/17 122 lb (55.3 kg)   05/16/17 120 lb (54.4 kg)   05/10/17 120 lb (54.4 kg)              We Performed the Following     HOME CARE NURSING REFERRAL          Today's Medication Changes          These changes are accurate as of: 6/22/17  1:10 PM.  If you have any questions, ask your nurse or doctor.               These medicines have changed or have updated prescriptions.        Dose/Directions    escitalopram 10 MG tablet   Commonly known as:  LEXAPRO   This may have changed:  Another medication with the same name was removed. Continue taking this medication, and follow the directions you see here.   Used for:  Moderate episode of recurrent major depressive disorder (H)   Changed by:  Makayla Khoury PA        Dose:  10 mg   Take 1 tablet (10 mg) " by mouth daily   Quantity:  30 tablet   Refills:  1                Primary Care Provider Office Phone # Fax #    Makayla SPARKS DAYANNA Khoury [phone]       [facility] [address] [address]        Equal Access to Services     ALEC MARY AH: Hadii aad ku hadasho Soomaali, waaxda luqadaha, qaybta kaalmada adeegyada, waxay idiin hayaan adeeg khcarlos ladeepaknadege ah. So [facility] [phone].    ATENCIÓN: Si habla español, tiene a quiroga disposición servicios gratuitos de asistencia lingüística. Llame al [phone].    We comply with applicable federal civil rights laws and [place] laws. We do not discriminate on the basis of race, color, national origin, age, disability sex, sexual orientation or gender identity.            Thank you!     Thank you for choosing [facility]  for your care. Our goal is always to provide you with excellent care. Hearing back from our patients is one way we can continue to improve our services. Please take a few minutes to complete the written survey that you may receive in the mail after your visit with us. Thank you!             Your Updated Medication List - Protect others around you: Learn how to safely use, store and throw away your medicines at www.disposemymeds.org.          This list is accurate as of: 6/22/17  1:10 PM.  Always use your most recent med list.                   Brand Name Dispense Instructions for use Diagnosis    ALPRAZolam 0.5 MG tablet    XANAX    15 tablet    Take 1 tablet (0.5 mg) by mouth 2 times daily as needed for anxiety    Anxiety attack, Major depressive disorder, recurrent episode, moderate (H)       clindamycin 1 % topical gel    CLINDAMAX    60 g    Apply topically 2 times daily    Cystic acne       cyclobenzaprine 10 MG tablet    FLEXERIL    30 tablet    Take 0.5-1 tablets (5-10 mg) by mouth 3 times daily as needed for muscle spasms    Chronic bilateral low back pain with left-sided sciatica        escitalopram 10 MG tablet    LEXAPRO    30 tablet    Take 1 tablet (10 mg) by mouth daily    Moderate episode of recurrent major depressive disorder (H)       etonogestrel 68 MG Impl    IMPLANON/NEXPLANON     1 each (68 mg) by Subdermal route continuous    Nexplanon insertion       fluticasone 50 MCG/ACT spray    FLONASE    1 Bottle    Spray 1 spray into both nostrils daily        meclizine 25 MG tablet    ANTIVERT    30 tablet    Take 1 tablet (25 mg) by mouth every 6 hours as needed for dizziness        modafinil 200 MG tablet    PROVIGIL    30 tablet    Take 1 tablet (200 mg) by mouth daily    Narcolepsy without cataplexy       polyethylene glycol powder    MIRALAX    510 g    Take 17 g (1 capful) by mouth daily As needed for irritable bowel sx.  If no bm in 2 day take one scoop    Abdominal pain, epigastric       traZODone 50 MG tablet    DESYREL    30 tablet    TAKE 1 TABLET(50 MG) BY MOUTH EVERY NIGHT AS NEEDED FOR SLEEP    Transient insomnia

## 2017-06-22 NOTE — PATIENT INSTRUCTIONS
Thank you for choosing [facility].   I have office hours 8:00 am to 4:30 pm on Monday's, Wednesday's, Thursday's and Friday's. My nurse and I are out of the office every Tuesday.    Following your visit, when your labs and diagnostic testing have returned, I will review then and you will be contacted by my nurse.  If you are on My Chart, you can also view results there.    For refills, notify your pharmacy regarding what you need and the pharmacy will generate a refill request. Do not call my nurse as she is unable to process refill request. Please plan ahead and allow 3-5 days for refill requests.    You will generally receive a reminder call the day prior to your appointment.  If you cannot attend your appointment, please cancel your appointment with as much notice as possible.  If there is a pattern of failure to present for your appointments, I cannot provide consistent, meaningful, ongoing care for you. It is very important to me that you come in for your care, so we can best assist you with your health care needs.    IMPORTANT:  Please note that it is my standard of practice to NOT participate in prescribing ongoing requested Narcotic Analgesic therapy, and/or participate in the prescribing of other controlled substances.  My nurse and I am happy to assist you with the process of referral for alternative pain management as needed, and other treatment modalities including but not limited to:  Physical Therapy, Physical Medicine and Rehab, Counseling, Chiropractic Care, Orthopedic Care, and non-narcotic medication management.     In the event that you need to be seen for emergent concerns and I am out of office,  please see one of my colleagues for acute concerns.  You may also present to  or ER.  I appreciate the opportunity to serve you and look forward to supporting your healthcare needs in the future. Please contact me with any questions or concerns that you may  have.    Sincerely,      Makayla Khoury RN, PA-C

## 2017-06-22 NOTE — MR AVS SNAPSHOT
After Visit Summary   6/22/2017    Kina Pond    MRN: [id number]           Patient Information     Date Of Birth          1985        Visit Information        Provider Department      6/22/2017 12:00 PM Shannan Hirsch LGSW [facility] [place]        Today's Diagnoses     Deferred diagnosis on axis I    -  1       Follow-ups after your visit        Your next 10 appointments already scheduled     Jul 24, 2017  1:45 PM CDT   (Arrive by 1:30 PM)   PROCEDURE with Katrin Cifuentes MD   [facility] ([facility] )    [address]  [place] [address]   [phone]              Who to contact     If you have questions or need follow up information about today's clinic visit or your schedule please contact [facility] directly at [phone].  Normal or non-critical lab and imaging results will be communicated to you by MyChart, letter or phone within 4 business days after the clinic has received the results. If you do not hear from us within 7 days, please contact the clinic through MyChart or phone. If you have a critical or abnormal lab result, we will notify you by phone as soon as possible.  Submit refill requests through Tykli or call your pharmacy and they will forward the refill request to us. Please allow 3 business days for your refill to be completed.          Additional Information About Your Visit        Care EveryWhere ID     This is your Care EveryWhere ID. This could be used by other organizations to access your [place] medical records  [id number]         Blood Pressure from Last 3 Encounters:   06/22/17 100/58   06/21/17 98/62   05/16/17 110/60    Weight from Last 3 Encounters:   06/22/17 122 lb (55.3 kg)   05/16/17 120 lb (54.4 kg)   05/10/17 120 lb (54.4 kg)              Today, you had the following     No orders found for display         Today's Medication Changes          These changes are accurate as of: 6/22/17   3:56 PM.  If you have any questions, ask your nurse or doctor.               These medicines have changed or have updated prescriptions.        Dose/Directions    escitalopram 10 MG tablet   Commonly known as:  LEXAPRO   This may have changed:  Another medication with the same name was removed. Continue taking this medication, and follow the directions you see here.   Used for:  Moderate episode of recurrent major depressive disorder (H)   Changed by:  Makayla Khoury PA        Dose:  10 mg   Take 1 tablet (10 mg) by mouth daily   Quantity:  30 tablet   Refills:  1                Primary Care Provider Office Phone # Fax #    DAYANNA Goodman [phone]       [facility] [address] [facility] [address]  [facility] [address]        Equal Access to Services     [facility] : Hadii aad ku hadasho Soomaali, waaxda luqadaha, qaybta kaalmada adeegyada, waxay idiin haybon mihaela coelho . So [facility] [phone].    ATENCIÓN: Si habla español, tiene a quiroga disposición servicios gratuitos de asistencia lingüística. Llame al [phone].    We comply with applicable federal civil rights laws and [place] laws. We do not discriminate on the basis of race, color, national origin, age, disability sex, sexual orientation or gender identity.            Thank you!     [facility]  [facility] your [facility]. Our goal is always to provide you with excellent care. Hearing back from our patients is one way we can continue to improve our services. Please take a few minutes to complete the written survey that you may receive in the mail after your visit with us. Thank you!             Your Updated Medication List - Protect others around you: Learn how to safely use, store and throw away your medicines at www.disposemymeds.org.          This list is accurate as of: 6/22/17  3:56 PM.  Always use your most recent med list.                   Brand Name Dispense Instructions for use Diagnosis     ALPRAZolam 0.5 MG tablet    XANAX    15 tablet    Take 1 tablet (0.5 mg) by mouth 2 times daily as needed for anxiety    Anxiety attack, Major depressive disorder, recurrent episode, moderate (H)       clindamycin 1 % topical gel    CLINDAMAX    60 g    Apply topically 2 times daily    Cystic acne       cyclobenzaprine 10 MG tablet    FLEXERIL    30 tablet    Take 0.5-1 tablets (5-10 mg) by mouth 3 times daily as needed for muscle spasms    Chronic bilateral low back pain with left-sided sciatica       escitalopram 10 MG tablet    LEXAPRO    30 tablet    Take 1 tablet (10 mg) by mouth daily    Moderate episode of recurrent major depressive disorder (H)       etonogestrel 68 MG Impl    IMPLANON/NEXPLANON     1 each (68 mg) by Subdermal route continuous    Nexplanon insertion       fluticasone 50 MCG/ACT spray    FLONASE    1 Bottle    Spray 1 spray into both nostrils daily        meclizine 25 MG tablet    ANTIVERT    30 tablet    Take 1 tablet (25 mg) by mouth every 6 hours as needed for dizziness        modafinil 200 MG tablet    PROVIGIL    30 tablet    Take 1 tablet (200 mg) by mouth daily    Narcolepsy without cataplexy       polyethylene glycol powder    MIRALAX    510 g    Take 17 g (1 capful) by mouth daily As needed for irritable bowel sx.  If no bm in 2 day take one scoop    Abdominal pain, epigastric       traZODone 50 MG tablet    DESYREL    30 tablet    TAKE 1 TABLET(50 MG) BY MOUTH EVERY NIGHT AS NEEDED FOR SLEEP    Transient insomnia

## 2017-06-22 NOTE — NURSING NOTE
"Chief Complaint   Patient presents with     Depression     Med check       Initial /58 (BP Location: Left arm, Patient Position: Chair, Cuff Size: Adult Regular)  Pulse 78  Temp 98.2  F (36.8  C) (Tympanic)  Ht 5' 4\" (1.626 m)  Wt 122 lb (55.3 kg)  SpO2 98%  BMI 20.94 kg/m2 Estimated body mass index is 20.94 kg/(m^2) as calculated from the following:    Height as of this encounter: 5' 4\" (1.626 m).    Weight as of this encounter: 122 lb (55.3 kg).  Medication Reconciliation: complete   Maryanne Villalta LPN    "

## 2017-06-23 ASSESSMENT — ANXIETY QUESTIONNAIRES: GAD7 TOTAL SCORE: 15

## 2017-06-23 ASSESSMENT — PATIENT HEALTH QUESTIONNAIRE - PHQ9: SUM OF ALL RESPONSES TO PHQ QUESTIONS 1-9: 13

## 2017-06-26 ENCOUNTER — OFFICE VISIT (OUTPATIENT)
Dept: BEHAVIORAL HEALTH | Facility: OTHER | Age: 32
End: 2017-06-26
Payer: COMMERCIAL

## 2017-06-26 ENCOUNTER — OFFICE VISIT (OUTPATIENT)
Dept: BEHAVIORAL HEALTH | Facility: OTHER | Age: 32
End: 2017-06-26
Attending: SOCIAL WORKER
Payer: COMMERCIAL

## 2017-06-26 DIAGNOSIS — F48.9 DEFERRED DIAGNOSIS ON AXIS I: Primary | ICD-10-CM

## 2017-06-26 DIAGNOSIS — F41.1 GENERALIZED ANXIETY DISORDER: Primary | ICD-10-CM

## 2017-06-26 DIAGNOSIS — F33.1 MODERATE EPISODE OF RECURRENT MAJOR DEPRESSIVE DISORDER (H): ICD-10-CM

## 2017-06-26 PROCEDURE — 90837 PSYTX W PT 60 MINUTES: CPT | Performed by: SOCIAL WORKER

## 2017-06-26 PROCEDURE — 90834 PSYTX W PT 45 MINUTES: CPT | Performed by: SOCIAL WORKER

## 2017-06-26 NOTE — PROGRESS NOTES
"Behavioral [facility]         Social Work Care Navigator Note      Patient: Kina Pond  Date: June 26, 2017  Preferred Name: Kina    Previous PHQ-9:   PHQ-9 SCORE 5/2/2017 5/4/2017 6/22/2017   Total Score - - -   Total Score 15 17 13     Previous DAINEL-7:   DANIEL-7 SCORE 5/2/2017 5/4/2017 6/22/2017   Total Score 18 17 15     ACE LEVEL:  ACE Score (Last Two) 11/10/2016 12/1/2016   ACE Raw Score 28 32   Activation Score 50 62.6   ACE Level 2 3       Preferred Contact: @[facility]([id number])@  Type of Contact Today: Face to Face in Clinic      Data: (subjective / Objective):  Patient Goals Areas:    Patient Stated Goals:    Recent ED/IP Admission or Discharge?   None  Recent ED/IP Admission or Discharge?   None    Patient Goals:  Goal Areas: Education  Patient stated goals: \" I will place a calander inside my contact case case to look at first thing in the morning\"      [facility] Core Service Provided:  Comprehensive Care Management: utilized the electronic medical record / patient registry to identify and support patient's health conditions / needs more effectively   Care Transitions: focused on the coordinated and seamless movement of patient between or within different levels of care or settings  Care Coordination: provided care management services/referrals necessary to ensure patient and their identified supports have access to medical, behavioral health, pharmacology and recovery support services.  Ensured that patient's care is integrated across all settings and services.   Health and Wellness Promotion  Individual and Family Support: aimed to help clients reduce barriers to achieving goals, increase health literacy and knowledge about chronic condition(s), increase self-efficacy skills, and improve health outcomes    Current Stressors / Issues / Care Plan Objective Addressed Today:  Kina felt she was \"not listened to at the last visit with Makayla\". Kina felt she was being judged about the request for a therapy " "dog. Kina also stated she did not want a home health nurse- because \"I am taking the ativan-like drug\" every night. I let her know Lexapro is not like Ativan and that it must be taken as prescribed. Kina said she started taken them every night-however when she takes it, it is harder for her to urinate. She feels as though she needs to push to pee.    Intervention:  Motivational Interviewing: Expressed Empathy/Understanding, Supported Autonomy, Collaboration, Evocation, Permission to raise concern or advise and Open-ended questions   Target Behavior(s): Explored current social supports and reinforced opportunities to increase engagement    Assessment: (Progress on Goals / Homework):  Take medicine as prescribed, and allow home health nurse in to set up meds for 2 weeks  Got the rest of paperwork from A for the dog and  filled it out- faxed back to A.    Plan: (Homework, other):  Patient was encouraged to continue to seek condition-related information and education.      Scheduled a Phone follow up appointment with TAMEKA SAN in 2 weeks     Patient has set self-identified goals and will monitor progress until the next appointment on: 7/10/17.     Shannan Hirsch, Social Work Care Coordinator               "

## 2017-06-26 NOTE — MR AVS SNAPSHOT
After Visit Summary   6/26/2017    Kina Pond    MRN: [id number]           Patient Information     Date Of Birth          1985        Visit Information        Provider Department      6/26/2017 10:00 AM Pamela Peters [facility] [place]        Today's Diagnoses     Generalized anxiety disorder    -  1    Moderate episode of recurrent major depressive disorder (H)           Follow-ups after your visit        Your next 10 appointments already scheduled     Jul 24, 2017  1:45 PM CDT   (Arrive by 1:30 PM)   PROCEDURE with Katrin Cifuentes MD   [facility] ([facility] )    [address]  [place] [address]   [phone]              Who to contact     If you have questions or need follow up information about today's clinic visit or your schedule please contact [facility] directly at [phone].  Normal or non-critical lab and imaging results will be communicated to you by MyChart, letter or phone within 4 business days after the clinic has received the results. If you do not hear from us within 7 days, please contact the clinic through MyChart or phone. If you have a critical or abnormal lab result, we will notify you by phone as soon as possible.  Submit refill requests through [employer] or call your pharmacy and they will forward the refill request to us. Please allow 3 business days for your refill to be completed.          Additional Information About Your Visit        Care EveryWhere ID     This is your Care EveryWhere ID. This could be used by other organizations to access your [place] medical records  [id number]         Blood Pressure from Last 3 Encounters:   06/22/17 100/58   06/21/17 98/62   05/16/17 110/60    Weight from Last 3 Encounters:   06/22/17 122 lb (55.3 kg)   05/16/17 120 lb (54.4 kg)   05/10/17 120 lb (54.4 kg)              Today, you had the following     No orders found for display       Primary Care  Provider Office Phone # Fax #    DAYANNA Goodman [phone]       [facility] [address]  [place] [address]        Equal Access to Services     MANJIT HERNANDEZ : Hadii kaela ku hadmahado Soomaali, waaxda luqadaha, qaybta kaalmada adeegyada, waxmarce camn corettayuliya lee laCaioavinash miranda. So [facility] [phone].    ATENCIÓN: Si habla español, tiene a quiroga disposición servicios gratuitos de asistencia lingüística. Llame al [phone].    We comply with applicable federal civil rights laws and [place] laws. We do not discriminate on the basis of race, color, national origin, age, disability sex, sexual orientation or gender identity.            Thank you!     Thank you for choosing [facility]  for your care. Our goal is always to provide you with excellent care. Hearing back from our patients is one way we can continue to improve our services. Please take a few minutes to complete the written survey that you may receive in the mail after your visit with us. Thank you!             Your Updated Medication List - Protect others around you: Learn how to safely use, store and throw away your medicines at www.disposemymeds.org.          This list is accurate as of: 6/26/17 10:25 AM.  Always use your most recent med list.                   Brand Name Dispense Instructions for use Diagnosis    ALPRAZolam 0.5 MG tablet    XANAX    15 tablet    Take 1 tablet (0.5 mg) by mouth 2 times daily as needed for anxiety    Anxiety attack, Major depressive disorder, recurrent episode, moderate (H)       clindamycin 1 % topical gel    CLINDAMAX    60 g    Apply topically 2 times daily    Cystic acne       cyclobenzaprine 10 MG tablet    FLEXERIL    30 tablet    Take 0.5-1 tablets (5-10 mg) by mouth 3 times daily as needed for muscle spasms    Chronic bilateral low back pain with left-sided sciatica       escitalopram 10 MG tablet    LEXAPRO    30 tablet    Take 1 tablet (10 mg) by mouth daily     Moderate episode of recurrent major depressive disorder (H)       etonogestrel 68 MG Impl    IMPLANON/NEXPLANON     1 each (68 mg) by Subdermal route continuous    Nexplanon insertion       fluticasone 50 MCG/ACT spray    FLONASE    1 Bottle    Spray 1 spray into both nostrils daily        meclizine 25 MG tablet    ANTIVERT    30 tablet    Take 1 tablet (25 mg) by mouth every 6 hours as needed for dizziness        modafinil 200 MG tablet    PROVIGIL    30 tablet    Take 1 tablet (200 mg) by mouth daily    Narcolepsy without cataplexy       polyethylene glycol powder    MIRALAX    510 g    Take 17 g (1 capful) by mouth daily As needed for irritable bowel sx.  If no bm in 2 day take one scoop    Abdominal pain, epigastric       traZODone 50 MG tablet    DESYREL    30 tablet    TAKE 1 TABLET(50 MG) BY MOUTH EVERY NIGHT AS NEEDED FOR SLEEP    Transient insomnia

## 2017-06-26 NOTE — MR AVS SNAPSHOT
After Visit Summary   6/26/2017    Kina Pond    MRN: [id number]           Patient Information     Date Of Birth          1985        Visit Information        Provider Department      6/26/2017 9:00 AM Shannan Hirsch [facility] [place]        Today's Diagnoses     Deferred diagnosis on axis I    -  1       Follow-ups after your visit        Your next 10 appointments already scheduled     Jul 24, 2017  1:45 PM CDT   (Arrive by 1:30 PM)   PROCEDURE with Katrin Cifuentes MD   [facility] [place] ([facility] )    [address]  [place] [address]   [phone]              Who to contact     If you have questions or need follow up information about today's clinic visit or your schedule please contact [facility] directly at [phone].  Normal or non-critical lab and imaging results will be communicated to you by MyChart, letter or phone within 4 business days after the clinic has received the results. If you do not hear from us within 7 days, please contact the clinic through MyChart or phone. If you have a critical or abnormal lab result, we will notify you by phone as soon as possible.  Submit refill requests through [employer] or call your pharmacy and they will forward the refill request to us. Please allow 3 business days for your refill to be completed.          Additional Information About Your Visit        Care EveryWhere ID     This is your Care EveryWhere ID. This could be used by other organizations to access your [place] medical records  [id number]         Blood Pressure from Last 3 Encounters:   06/22/17 100/58   06/21/17 98/62   05/16/17 110/60    Weight from Last 3 Encounters:   06/22/17 122 lb (55.3 kg)   05/16/17 120 lb (54.4 kg)   05/10/17 120 lb (54.4 kg)              Today, you had the following     No orders found for display       Primary Care Provider Office Phone # Fax #    DAYANNA Goodman [phone]  [phone]       [facility] [address]  [place] [address]        Equal Access to Services     MANJIT HERNANDEZ : Hadii aad ku hadjoana Louis, wajvda luqadaha, qaybta kaalmada gillian, alton lee ladeepaknadege ruben. So [facility] [phone].    ATENCIÓN: Si habla español, tiene a quiroga disposición servicios gratuitos de asistencia lingüística. Llame al [phone].    We comply with applicable federal civil rights laws and [place] laws. We do not discriminate on the basis of race, color, national origin, age, disability sex, sexual orientation or gender identity.            Thank you!     Thank you for choosing [facility]  for your care. Our goal is always to provide you with excellent care. Hearing back from our patients is one way we can continue to improve our services. Please take a few minutes to complete the written survey that you may receive in the mail after your visit with us. Thank you!             Your Updated Medication List - Protect others around you: Learn how to safely use, store and throw away your medicines at www.disposemymeds.org.          This list is accurate as of: 6/26/17 12:06 PM.  Always use your most recent med list.                   Brand Name Dispense Instructions for use Diagnosis    ALPRAZolam 0.5 MG tablet    XANAX    15 tablet    Take 1 tablet (0.5 mg) by mouth 2 times daily as needed for anxiety    Anxiety attack, Major depressive disorder, recurrent episode, moderate (H)       clindamycin 1 % topical gel    CLINDAMAX    60 g    Apply topically 2 times daily    Cystic acne       cyclobenzaprine 10 MG tablet    FLEXERIL    30 tablet    Take 0.5-1 tablets (5-10 mg) by mouth 3 times daily as needed for muscle spasms    Chronic bilateral low back pain with left-sided sciatica       escitalopram 10 MG tablet    LEXAPRO    30 tablet    Take 1 tablet (10 mg) by mouth daily    Moderate episode of recurrent major depressive disorder (H)        etonogestrel 68 MG Impl    IMPLANON/NEXPLANON     1 each (68 mg) by Subdermal route continuous    Nexplanon insertion       fluticasone 50 MCG/ACT spray    FLONASE    1 Bottle    Spray 1 spray into both nostrils daily        meclizine 25 MG tablet    ANTIVERT    30 tablet    Take 1 tablet (25 mg) by mouth every 6 hours as needed for dizziness        modafinil 200 MG tablet    PROVIGIL    30 tablet    Take 1 tablet (200 mg) by mouth daily    Narcolepsy without cataplexy       polyethylene glycol powder    MIRALAX    510 g    Take 17 g (1 capful) by mouth daily As needed for irritable bowel sx.  If no bm in 2 day take one scoop    Abdominal pain, epigastric       traZODone 50 MG tablet    DESYREL    30 tablet    TAKE 1 TABLET(50 MG) BY MOUTH EVERY NIGHT AS NEEDED FOR SLEEP    Transient insomnia

## 2017-06-30 ENCOUNTER — TELEPHONE (OUTPATIENT)
Dept: BEHAVIORAL HEALTH | Facility: OTHER | Age: 32
End: 2017-06-30

## 2017-06-30 NOTE — TELEPHONE ENCOUNTER
"Behavioral [facility]  @FLOW([id number])@      Social Work Care Navigator Note      Patient: Kina Pond  Date: June 30, 2017  Preferred Name: Kina    Previous PHQ-9:   PHQ-9 SCORE 5/2/2017 5/4/2017 6/22/2017   Total Score - - -   Total Score 15 17 13     Previous DANIEL-7:   DANIEL-7 SCORE 5/2/2017 5/4/2017 6/22/2017   Total Score 18 17 15     ACE LEVEL:  ACE Score (Last Two) 11/10/2016 12/1/2016   ACE Raw Score 28 32   Activation Score 50 62.6   ACE Level 2 3       Preferred Contact: @JACOB([id number])@  Type of Contact Today: Phone call (patient / identified key support person reached)      Data: (subjective / Objective):  Patient Goals Areas: @FLOW([id number])@  Patient Stated Goals: @FLOW([id number])@  Recent ED/IP Admission or Discharge?   None  Recent ED/IP Admission or Discharge?   None    Patient Goals:  Goal Areas: Education  Patient stated goals: \" I will place a calander inside my contact case case to look at first thing in the morning\"      [facility] Core Service Provided:  Comprehensive Care Management: utilized the electronic medical record / patient registry to identify and support patient's health conditions / needs more effectively   Health and Wellness Promotion    Current Stressors / Issues / Care Plan Objective Addressed Today:  Got  dog- things are going well.    Intervention:  Motivational Interviewing: Expressed Empathy/Understanding and Supported Autonomy, Collaboration, Evocation   Target Behavior(s): Explored current social supports and reinforced opportunities to increase engagement    Assessment: (Progress on Goals / Homework):  HAs calendar on kitchen table, she looks at it when she gets up.    Plan: (Homework, other):  Patient was encouraged to continue to seek condition-related information and education.      Scheduled a Phone follow up appointment with TAMEKA SAN in 2 weeks     Patient has set self-identified goals and will monitor progress until the next appointment on: 7/14/17.  "    Shannan Hirsch, Social Work Care Coordinator

## 2017-07-01 ENCOUNTER — HEALTH MAINTENANCE LETTER (OUTPATIENT)
Age: 32
End: 2017-07-01

## 2017-07-14 ENCOUNTER — TELEPHONE (OUTPATIENT)
Dept: BEHAVIORAL HEALTH | Facility: OTHER | Age: 32
End: 2017-07-14

## 2017-07-14 NOTE — TELEPHONE ENCOUNTER
"Behavioral [facility]  @[email or web address]([id number])@      Social Work Care Navigator Note      Patient: Kina Pond  Date: July 14, 2017  Preferred Name: Kina    Previous PHQ-9:   PHQ-9 SCORE 5/2/2017 5/4/2017 6/22/2017   Total Score - - -   Total Score 15 17 13     Previous DANIEL-7:   DANIEL-7 SCORE 5/2/2017 5/4/2017 6/22/2017   Total Score 18 17 15     AEC LEVEL:  ACE Score (Last Two) 11/10/2016 12/1/2016   ACE Raw Score 28 32   Activation Score 50 62.6   ACE Level 2 3       Preferred Contact: @JACOB([id number])@  Type of Contact Today: Phone call (patient / identified key support person reached)      Data: (subjective / Objective):  Patient Goals Areas: @FLOW([id number])@  Patient Stated Goals: @FLOW([id number])@  Recent ED/IP Admission or Discharge?   None  Recent ED/IP Admission or Discharge?   None    Patient Goals:  Goal Areas: Education  Patient stated goals: \" I will place a calender inside my contact case case to look at first thing in the morning\"      [facility] Core Service Provided:  Comprehensive Care Management: utilized the electronic medical record / patient registry to identify and support patient's health conditions / needs more effectively   Health and Wellness Promotion  Individual and Family Support: aimed to help clients reduce barriers to achieving goals, increase health literacy and knowledge about chronic condition(s), increase self-efficacy skills, and improve health outcomes    Current Stressors / Issues / Care Plan Objective Addressed Today:  Has her dog: Javy he is a benefit to her emotional well being.  She takes him out at least twice a day. Before the dog, Kina had real difficulty leaving the house even to get mail. They are going out even further when she \"doesn't have to go somewhere\" They have gone swimming.     Home Health nurse did not come. Kina is taking the Provengil in the am, Lexapro and multi vit in the PM before bed.  Kina states she is taking her medicine as prescribed. "     Intervention:  Motivational Interviewing: Expressed Empathy/Understanding, Supported Autonomy, Collaboration, Evocation, Open-ended questions and Reflections: simple and complex   Target Behavior(s): Explored and resolved challenges related to taking anti-depressants as prescribed    Assessment: (Progress on Goals / Homework):  Kina has the calender next to the microwave- but states she looks at it everyday.    Plan: (Homework, other):  Patient was encouraged to continue to seek condition-related information and education.      Scheduled a Phone follow up appointment with TAMEKA SAN in 2 weeks     Patient has set self-identified goals and will monitor progress until the next appointment on: 07/28/17.     Shannan Hirsch, Social Work Care Coordinator

## 2017-07-19 DIAGNOSIS — G47.419 NARCOLEPSY WITHOUT CATAPLEXY(347.00): ICD-10-CM

## 2017-07-20 DIAGNOSIS — G47.419 NARCOLEPSY WITHOUT CATAPLEXY(347.00): ICD-10-CM

## 2017-07-20 RX ORDER — MODAFINIL 200 MG/1
200 TABLET ORAL DAILY
Qty: 30 TABLET | Refills: 0 | Status: SHIPPED | OUTPATIENT
Start: 2017-07-20 | End: 2017-08-18

## 2017-07-20 RX ORDER — MODAFINIL 200 MG/1
TABLET ORAL
Qty: 30 TABLET | Refills: 0 | Status: SHIPPED | OUTPATIENT
Start: 2017-07-20 | End: 2017-07-20

## 2017-07-24 ENCOUNTER — OFFICE VISIT (OUTPATIENT)
Dept: OTOLARYNGOLOGY | Facility: OTHER | Age: 32
End: 2017-07-24
Attending: OTOLARYNGOLOGY

## 2017-07-24 ENCOUNTER — TELEPHONE (OUTPATIENT)
Dept: BEHAVIORAL HEALTH | Facility: OTHER | Age: 32
End: 2017-07-24

## 2017-07-24 VITALS
OXYGEN SATURATION: 99 % | HEART RATE: 72 BPM | TEMPERATURE: 97.9 F | BODY MASS INDEX: 20.73 KG/M2 | SYSTOLIC BLOOD PRESSURE: 102 MMHG | HEIGHT: 63 IN | DIASTOLIC BLOOD PRESSURE: 60 MMHG | WEIGHT: 117 LBS

## 2017-07-24 DIAGNOSIS — L81.9 CHANGE IN PIGMENTED LESION OF FACE: ICD-10-CM

## 2017-07-24 DIAGNOSIS — L72.9 SKIN CYST: ICD-10-CM

## 2017-07-24 DIAGNOSIS — Z71.6 TOBACCO ABUSE COUNSELING: Primary | ICD-10-CM

## 2017-07-24 DIAGNOSIS — Z80.8 FAMILY HISTORY OF MELANOMA: ICD-10-CM

## 2017-07-24 PROCEDURE — 99204 OFFICE O/P NEW MOD 45 MIN: CPT

## 2017-07-24 PROCEDURE — 99203 OFFICE O/P NEW LOW 30 MIN: CPT | Performed by: OTOLARYNGOLOGY

## 2017-07-24 RX ORDER — NICOTINE 21 MG/24HR
1 PATCH, TRANSDERMAL 24 HOURS TRANSDERMAL EVERY 24 HOURS
Qty: 30 PATCH | Refills: 3 | Status: SHIPPED | OUTPATIENT
Start: 2017-07-24

## 2017-07-24 ASSESSMENT — PAIN SCALES - GENERAL: PAINLEVEL: MODERATE PAIN (4)

## 2017-07-24 NOTE — PROGRESS NOTES
Otolaryngology Consultation    Patient: Kina Pond  : 1985    Patient presents with:  Consult: keratinous cyst- jawline bilateral- Mandi       HPI:  Kina Pond is a 31 year old female seen today for bilateral keratinous cysts along her mandible  These have been present 1 year and are tender and bother her most days  No spontaneous drainage  Kina will occasionally manipulate the area but no prior excisions or incisions    She also notes a changing, growing lesion above her eyebrow    Significant hx of sun exposure and father with history of melanoma    She does smoke 1 ppd, 20 pack year history    Current Outpatient Rx   Medication Sig Dispense Refill     modafinil (PROVIGIL) 200 MG tablet Take 1 tablet (200 mg) by mouth daily 30 tablet 0     escitalopram (LEXAPRO) 10 MG tablet Take 1 tablet (10 mg) by mouth daily 30 tablet 1     fluticasone (FLONASE) 50 MCG/ACT spray Spray 1 spray into both nostrils daily 1 Bottle 0     etonogestrel (IMPLANON/NEXPLANON) 68 MG IMPL 1 each (68 mg) by Subdermal route continuous       polyethylene glycol (MIRALAX) powder Take 17 g (1 capful) by mouth daily As needed for irritable bowel sx.  If no bm in 2 day take one scoop 510 g 1     ALPRAZolam (XANAX) 0.5 MG tablet Take 1 tablet (0.5 mg) by mouth 2 times daily as needed for anxiety (Patient not taking: Reported on 2017) 15 tablet 0     traZODone (DESYREL) 50 MG tablet TAKE 1 TABLET(50 MG) BY MOUTH EVERY NIGHT AS NEEDED FOR SLEEP (Patient not taking: Reported on 2017) 30 tablet 3     meclizine (ANTIVERT) 25 MG tablet Take 1 tablet (25 mg) by mouth every 6 hours as needed for dizziness (Patient not taking: Reported on 2017) 30 tablet 1     clindamycin (CLINDAMAX) 1 % topical gel Apply topically 2 times daily (Patient not taking: Reported on 2017) 60 g 11     cyclobenzaprine (FLEXERIL) 10 MG tablet Take 0.5-1 tablets (5-10 mg) by mouth 3 times daily as needed for muscle spasms (Patient not taking:  "Reported on 2017) 30 tablet 1       Allergies: Review of patient's allergies indicates no known allergies.     Past Medical History:   Diagnosis Date     Chemical dependency (H) 10/8/2014     Major depression 10/8/2014     Major depressive disorder, recurrent episode, unspecified 2011     Tobacco abuse      Tobacco use disorder 2011       Past Surgical History:   Procedure Laterality Date     BREAST SURGERY  2013    right lumpectomy     C ANESTH,VAGINAL DELIVERY  2012    Pregnancy full-term; 4.00 hr labor ; APGAR:9 (1 min) 9 (5 min)  (10 min)  -1st degree perineal laceration - Pitocin     C ANESTH,VAGINAL DELIVERY      Pregnancy; 12 hr labor ; 40 week 6 lb(s) 12 oz Male     C ANESTH,VAGINAL DELIVERY      Pregnancy; , 34 week 4 lb(s) 12 oz Male; in hospital for 1 week, ruptured membranes, resealed ([place], [place])     C INDUCED ABORTN BY DIL/EVAC      Pregnancy, induced  due to birth defect; unknown sex       CHOLECYSTECTOMY         ENT family history reviewed    Social History   Substance Use Topics     Smoking status: Current Every Day Smoker     Packs/day: 0.50     Years: 20.00     Types: Cigarettes     Last attempt to quit: 2016     Smokeless tobacco: Never Used      Comment: cutting down to 4-5 cig daily      Alcohol use Yes      Comment: rare       Review of Systems  ROS: 10 point ROS neg other than the symptoms noted above in the HPI and extremity tingling, numbness, hot/cold intolerance, depression, blurred vision    Physical Exam  /60 (BP Location: Left arm, Patient Position: Chair, Cuff Size: Adult Regular)  Pulse 72  Temp 97.9  F (36.6  C) (Tympanic)  Ht 5' 3\" (1.6 m)  Wt 117 lb (53.1 kg)  SpO2 99%  BMI 20.73 kg/m2  General - The patient is well nourished and well developed, and appears to have good nutritional status.  Alert and oriented to person and place, answers questions and cooperates with examination appropriately.   Head " and Face - Normocephalic and atraumatic, with no gross asymmetry noted.  The facial nerve is intact, with strong symmetric movements.  Voice and Breathing - The patient was breathing comfortably without the use of accessory muscles. There was no wheezing, stridor, or stertor.  The patients voice was clear and strong, and had appropriate pitch and quality.  Ears -The external auditory canals are patent, the tympanic membranes are intact without effusion, retraction or mass.  Bony landmarks are intact.  Eyes - Extraocular movements intact, and the pupils were reactive to light.  Sclera were not icteric or injected, conjunctiva were pink and moist.  Mouth - Examination of the oral cavity showed pink, healthy oral mucosa. No lesions or ulcerations noted.  The tongue was mobile and midline, and the dentition were in good condition.  No obvious dental disease or carries  Throat - The walls of the oropharynx were smooth, pink, moist, symmetric, and had no lesions or ulcerations.  The tonsillar pillars and soft palate were symmetric.  The uvula was midline on elevation.    Neck - Normal midline excursion of the laryngotracheal complex during swallowing.  Full range of motion on passive movement.  Palpation of the occipital, submental, submandibular, internal jugular chain, and supraclavicular nodes did not demonstrate any abnormal lymph nodes or masses.  Palpation of the thyroid was soft and smooth, with no nodules or goiter appreciated.  The trachea was mobile and midline.  Bilateral angle of the mandible sebaceous cysts with mild surrounding erythema no active drainage, each  1.0 cm, Dr. Raymond photos reviewed as well  Left forehead, suprabrow, with raised nevus somewhat irregular, no ulceration, 1.0 cm  She tends to hold her smile slightly asymmetrical but facial nerve is intact, HB grade 1/6    Nose - External contour is symmetric, no gross deflection or scars.  Nasal mucosa is pink and moist with no abnormal mucus.   The septum and turbinates were evaluated: non obstructive.  No polyps, masses, or purulence noted on examination.      Impression and Plan- Kina Pond is a 31 year old female with:    ICD-10-CM    1. Tobacco abuse counseling Z71.6 nicotine (NICODERM CQ) 14 MG/24HR 24 hr patch   2. Skin cyst L72.9    3. Change in pigmented lesion of face L81.9    4. Family history of melanoma Z80.8        I discussed the risks and complications of excision bilateral mandibular skin cysts, left forehead lesion with frozens, including anesthesia, bleeding, infection, injury to major/minor arteries, nerves and veins, scar formation, hypertrophic healing or keloid, numbness to area, benign versus malignant pathology and possible need for further surgery.  The possibility of false negative frozen section pathology discussed.  Facial nerve anatomy was discussed and due to the superficial location of the marginal mandibular branch at the angle of the mandible as well as possible deeper SCT involvement of the cyst, I will use NIM.  I discussed the rare but possible chance of permanent facial nerve paralysis following surgery.  All questions were answered.      It takes 20 years of quitting tobacco to be at same risk of developing head and neck cancer as a never smoker.  This was discussed with the patient today and they voiced understanding.  Tobacco cessation strongly encouraged.  The patient was told the perioperative period is one of the best times to attempt cessation and she has agreed to quit and start nicoderm.           Katrin Cifuentes D.O.  Otolaryngology/Head and Neck Surgery  Allergy

## 2017-07-24 NOTE — TELEPHONE ENCOUNTER
[facility]  @FLOW([id number])@      Social Work Care Navigator Note      Patient: Kina Pond  Date: July 24, 2017  Preferred Name: Kina    Previous PHQ-9:   PHQ-9 SCORE 5/2/2017 5/4/2017 6/22/2017   Total Score - - -   Total Score 15 17 13     Previous DANIEL-7:   DANIEL-7 SCORE 5/2/2017 5/4/2017 6/22/2017   Total Score 18 17 15     ACE LEVEL:  ACE Score (Last Two) 11/10/2016 12/1/2016   ACE Raw Score 28 32   Activation Score 50 62.6   ACE Level 2 3       Preferred Contact: @JACOB([id number])@  Type of Contact Today: Phone call (not reached/unavailable)      Data: (subjective / Objective):  Patient Goals Areas: @FLOW([id number])@  Patient Stated Goals: @FLOW([id number])@  Recent ED/IP Admission or Discharge?   None  Attempted to reach patient, but was unsuccessful.  Plan to attempt again.  Shannan Hirsch

## 2017-07-24 NOTE — PATIENT INSTRUCTIONS
Thank you for allowing Dr. Cifuentes and our ENT team to participate in your care.  If you have a scheduling or an appointment question please contact [facility] our Health Unit Coordinator at their direct line [phone].   ALL nursing questions or concerns can be directed to your ENT nurse at: [phone] - Pamela    Follow up in surgery  Congrats on Quitting Smoking!!!!!      POST PROCEDURE INSTRUCTIONS      Remove your dressing in 24 hours (If you have one)    Wash incision with a mixture of half water and half hydrogen peroxide 3 times daily.    Apply Aquaphor Healing Ointment to the wound 3 times daily. (Unless specified Bacitracin)    Cover with a clean dressing if in a dirty james environment or when wet/soiled    Keep incision clean and dry   Do NOT soak in water such as a tub bath or swimming   Do NOT put make-up, powders, hairspray, lotions, etc on the incision       You can apply ice to the surgical area to help reduce swelling. (no longer than 20 minutes at a time)      You can use acetaminophen(Tylenol) or the prescription you received for pain.       If you have any bleeding, cover the wound with clean gauze and hold pressure for 10 Minutes. If the bleeding does not stop or is heavy and profuse, call the clinic or go to the Urgent Care/Emergency Department.    SIGNS OF INFECTION ARE:    Redness, swelling, red streaks, pus, drainage, warmth, fever, increased pain, foul smell.     Contact your primary health care provider if you notice any of the warning signs.     FOLLOW - UP    Follow-up in clinic for a nurse only visit in 7 days for suture removal.     Pathology results will be called to you when they are back. Usually 7-10 days.      6 WEEKS POST PROCEDURE      Apply ANY type of lotion to the suture site(Example - Vaseline Intensive Care or Vitamin E)    Massage the surgical area 1-2 times daily in a circular motion for 5 minutes, for a period of 2 months. This will help the scar heal better.          HOW TO PREPARE-      You need to have a scheduled Pre-Op with your primary care physician within 30 days of your scheduled surgery. You should be set up with this before you leave today.       You need a friend or family member available to drive you home AND stay with you for 24 hours after you leave the hospital. You will not be allowed to drive yourself. IF you need to take a taxi or the bus you MUST have a responsible person to ride with you. YOUR PROCEDURE WILL BE CANCELLED IF YOU DO NOT HAVE A RESPONSIBLE ADULT TO DRIVE YOU HOME.       You CANNOT have anything to eat or drink after midnight the night before your surgery, including water and coffee. Your stomach needs to be completely empty. Do NOT chew gum, suck on hard candy, or smoke. You can brush your teeth the morning of surgery.       You need to call our Surgery Education Nurses 1-2 weeks prior to your surgery date at  [phone] or toll free [phone]. Please have your medication and allergy lists ready.      Stop your aspirin or other NSAIDs(Ibuprofen, Motrin, Aleve, Celebrex, Naproxen, etc...) 7 days before your surgery.      Hospital admitting will call you the day before your surgery with your exact arrival time.       Please call your primary care physician if you should become ill within 24 hours of scheduled surgery. (ex.vomiting, diarrhea, fever)          You will need to wash the night before AND the morning of you procedure with a liquid antibacterial soap, like Dial.

## 2017-07-24 NOTE — MR AVS SNAPSHOT
After Visit Summary   7/24/2017    Kina Pond    MRN: [id number]           Patient Information     Date Of Birth          1985        Visit Information        Provider Department      7/24/2017 1:45 PM Katrin Cifuentes MD [facility]        Care Instructions    Thank you for allowing Dr. Cifuentes and our ENT team to participate in your care.  If you have a scheduling or an appointment question please contact [facility] Unit Coordinator at their direct line [phone].   ALL nursing questions or concerns can be directed to your ENT nurse at: [phone] - Pamela    Follow up in surgery  Congrats on Quitting Smoking!!!!!      POST PROCEDURE INSTRUCTIONS      Remove your dressing in 24 hours (If you have one)    Wash incision with a mixture of half water and half hydrogen peroxide 3 times daily.    Apply Aquaphor Healing Ointment to the wound 3 times daily. (Unless specified Bacitracin)    Cover with a clean dressing if in a dirty james environment or when wet/soiled    Keep incision clean and dry   Do NOT soak in water such as a tub bath or swimming   Do NOT put make-up, powders, hairspray, lotions, etc on the incision       You can apply ice to the surgical area to help reduce swelling. (no longer than 20 minutes at a time)      You can use acetaminophen(Tylenol) or the prescription you received for pain.       If you have any bleeding, cover the wound with clean gauze and hold pressure for 10 Minutes. If the bleeding does not stop or is heavy and profuse, call the clinic or go to the Urgent Care/Emergency Department.    SIGNS OF INFECTION ARE:    Redness, swelling, red streaks, pus, drainage, warmth, fever, increased pain, foul smell.     Contact your primary health care provider if you notice any of the warning signs.     FOLLOW - UP    Follow-up in clinic for a nurse only visit in 7 days for suture removal.     Pathology results will be called to you when they  are back. Usually 7-10 days.      6 WEEKS POST PROCEDURE      Apply ANY type of lotion to the suture site(Example - Vaseline Intensive Care or Vitamin E)    Massage the surgical area 1-2 times daily in a circular motion for 5 minutes, for a period of 2 months. This will help the scar heal better.         HOW TO PREPARE-      You need to have a scheduled Pre-Op with your primary care physician within 30 days of your scheduled surgery. You should be set up with this before you leave today.       You need a friend or family member available to drive you home AND stay with you for 24 hours after you leave the hospital. You will not be allowed to drive yourself. IF you need to take a taxi or the bus you MUST have a responsible person to ride with you. YOUR PROCEDURE WILL BE CANCELLED IF YOU DO NOT HAVE A RESPONSIBLE ADULT TO DRIVE YOU HOME.       You CANNOT have anything to eat or drink after midnight the night before your surgery, including water and coffee. Your stomach needs to be completely empty. Do NOT chew gum, suck on hard candy, or smoke. You can brush your teeth the morning of surgery.       You need to call our Surgery Education Nurses 1-2 weeks prior to your surgery date at  [phone] or toll free [phone]. Please have your medication and allergy lists ready.      Stop your aspirin or other NSAIDs(Ibuprofen, Motrin, Aleve, Celebrex, Naproxen, etc...) 7 days before your surgery.      Hospital admitting will call you the day before your surgery with your exact arrival time.       Please call your primary care physician if you should become ill within 24 hours of scheduled surgery. (ex.vomiting, diarrhea, fever)          You will need to wash the night before AND the morning of you procedure with a liquid antibacterial soap, like Dial.             Follow-ups after your visit        Who to contact     If you have questions or need follow up information about today's clinic visit or your schedule please  "contact [facility] directly at [phone].  Normal or non-critical lab and imaging results will be communicated to you by MyChart, letter or phone within 4 business days after the clinic has received the results. If you do not hear from us within 7 days, please contact the clinic through MyChart or phone. If you have a critical or abnormal lab result, we will notify you by phone as soon as possible.  Submit refill requests through [facility] or call your pharmacy and they will forward the refill request to us. Please allow 3 business days for your refill to be completed.          Additional Information About Your Visit        Care EveryWhere ID     This is your Care EveryWhere ID. This could be used by other organizations to access your [place] medical records  [id number]        Your Vitals Were     Pulse Temperature Height Pulse Oximetry BMI (Body Mass Index)       72 97.9  F (36.6  C) (Tympanic) 5' 3\" (1.6 m) 99% 20.73 kg/m2        Blood Pressure from Last 3 Encounters:   07/24/17 102/60   06/22/17 100/58   06/21/17 98/62    Weight from Last 3 Encounters:   07/24/17 117 lb (53.1 kg)   06/22/17 122 lb (55.3 kg)   05/16/17 120 lb (54.4 kg)              Today, you had the following     No orders found for display       Primary Care Provider Office Phone # Fax #    DAYANNA Goodman [phone]       [facility] [address]  [place] [address]        Equal Access to Services     [facility]: Hadii aad ku hadasho Soomaali, waaxda luqadaha, qaybta kaalmada adeegyada, waxay idiin haybon mihaela coelho . So [facility] [phone].    ATENCIÓN: Si maryla jose g, tiene a quiroga disposición servicios gratuitos de asistencia lingüística. Llame al [phone].    We comply with applicable federal civil rights laws and [place] laws. We do not discriminate on the basis of race, color, national origin, age, disability sex, sexual orientation or gender identity.          "   Thank you!     [facility] you for [facility]  [facility]. Our goal is always to provide you with excellent care. Hearing back from our patients is one way we can continue to improve our services. Please take a few minutes to complete the written survey that you may receive in the mail after your visit with us. Thank you!             Your Updated Medication List - Protect others around you: Learn how to safely use, store and throw away your medicines at www.disposemymeds.org.          This list is accurate as of: 7/24/17  2:13 PM.  Always use your most recent med list.                   Brand Name Dispense Instructions for use Diagnosis    ALPRAZolam 0.5 MG tablet    XANAX    15 tablet    Take 1 tablet (0.5 mg) by mouth 2 times daily as needed for anxiety    Anxiety attack, Major depressive disorder, recurrent episode, moderate (H)       clindamycin 1 % topical gel    CLINDAMAX    60 g    Apply topically 2 times daily    Cystic acne       cyclobenzaprine 10 MG tablet    FLEXERIL    30 tablet    Take 0.5-1 tablets (5-10 mg) by mouth 3 times daily as needed for muscle spasms    Chronic bilateral low back pain with left-sided sciatica       escitalopram 10 MG tablet    LEXAPRO    30 tablet    Take 1 tablet (10 mg) by mouth daily    Moderate episode of recurrent major depressive disorder (H)       etonogestrel 68 MG Impl    IMPLANON/NEXPLANON     1 each (68 mg) by Subdermal route continuous    Nexplanon insertion       fluticasone 50 MCG/ACT spray    FLONASE    1 Bottle    Spray 1 spray into both nostrils daily        meclizine 25 MG tablet    ANTIVERT    30 tablet    Take 1 tablet (25 mg) by mouth every 6 hours as needed for dizziness        modafinil 200 MG tablet    PROVIGIL    30 tablet    Take 1 tablet (200 mg) by mouth daily    Narcolepsy without cataplexy       polyethylene glycol powder    MIRALAX    510 g    Take 17 g (1 capful) by mouth daily As needed for irritable bowel sx.  If no bm in  2 day take one scoop    Abdominal pain, epigastric       traZODone 50 MG tablet    DESYREL    30 tablet    TAKE 1 TABLET(50 MG) BY MOUTH EVERY NIGHT AS NEEDED FOR SLEEP    Transient insomnia

## 2017-07-25 ENCOUNTER — HOSPITAL ENCOUNTER (OUTPATIENT)
Facility: HOSPITAL | Age: 32
End: 2017-07-25
Attending: OTOLARYNGOLOGY | Admitting: OTOLARYNGOLOGY

## 2017-07-31 ENCOUNTER — TELEPHONE (OUTPATIENT)
Dept: BEHAVIORAL HEALTH | Facility: OTHER | Age: 32
End: 2017-07-31

## 2017-07-31 NOTE — TELEPHONE ENCOUNTER
"[facility]  @FLOW([id number])@      Social Work Care Navigator Note      Patient: Kina Pond  Date: July 31, 2017  Preferred Name: Kina    Previous PHQ-9:   PHQ-9 SCORE 5/2/2017 5/4/2017 6/22/2017   Total Score - - -   Total Score 15 17 13     Previous DANIEL-7:   DANIEL-7 SCORE 5/2/2017 5/4/2017 6/22/2017   Total Score 18 17 15     AEC LEVEL:  ACE Score (Last Two) 11/10/2016 12/1/2016   ACE Raw Score 28 32   Activation Score 50 62.6   ACE Level 2 3       Preferred Contact: @JACOB([id number])@  Type of Contact Today: Phone call (patient / identified key support person reached)      Data: (subjective / Objective):  Patient Goals Areas: @FLOW([id number])@  Patient Stated Goals: @FLOW([id number])@  Recent ED/IP Admission or Discharge?   None  Patient Goals  Goal Areas: Education  Patient stated goals: \" I will place a calander inside my contact case case to look at first thing in the morning\"    [facility] Service Provided:  Comprehensive Care Management: utilized the electronic medical record / patient registry to identify and support patient's health conditions / needs more effectively   Individual and Family Support: aimed to help clients reduce barriers to achieving goals, increase health literacy and knowledge about chronic condition(s), increase self-efficacy skills, and improve health outcomes     Data:  Kina's phone is out of minutes- goes straight to busy signal. Trav stated they are moving to [place] 8/8. He will let Kina know to call me when he gets home.    Plan:  Inquire about lack of insurance, help with move transition    Shannan Hirsch, Social Work Care Coordinator                 Next 5 appointments (look out 90 days)     Aug 15, 2017  9:30 AM CDT   (Arrive by 9:15 AM)   Return Visit with Ofelia Zaragoza PA-C   [facility]    [address] [place] [address]   [phone]                "

## 2017-08-07 ENCOUNTER — TELEPHONE (OUTPATIENT)
Dept: OTOLARYNGOLOGY | Facility: OTHER | Age: 32
End: 2017-08-07

## 2017-08-07 NOTE — TELEPHONE ENCOUNTER
The patient calls today wishing to cancel her surgery tomorrow. She states that she is moving and will call back to reschedule at a later date.

## 2017-08-17 ENCOUNTER — TELEPHONE (OUTPATIENT)
Dept: FAMILY MEDICINE | Facility: OTHER | Age: 32
End: 2017-08-17

## 2017-08-18 ENCOUNTER — TELEPHONE (OUTPATIENT)
Dept: BEHAVIORAL HEALTH | Facility: OTHER | Age: 32
End: 2017-08-18

## 2017-08-18 DIAGNOSIS — G47.419 NARCOLEPSY WITHOUT CATAPLEXY(347.00): ICD-10-CM

## 2017-08-18 NOTE — TELEPHONE ENCOUNTER
[facility]  @FLOW([id number])@      Social Work Care Navigator Note      Patient: Kina Pond  Date: August 18, 2017  Preferred Name: Kina    Previous PHQ-9:   PHQ-9 SCORE 5/2/2017 5/4/2017 6/22/2017   Total Score - - -   Total Score 15 17 13     Previous DANIEL-7:   DANIEL-7 SCORE 5/2/2017 5/4/2017 6/22/2017   Total Score 18 17 15     ACE LEVEL:  ACE Score (Last Two) 11/10/2016 12/1/2016   ACE Raw Score 28 32   Activation Score 50 62.6   ACE Level 2 3       Preferred Contact: @JACOB([id number])@  Type of Contact Today: Phone call (not reached/unavailable)      Data: (subjective / Objective):  Patient Goals Areas: @FLOW([id number])@  Patient Stated Goals: @FLOW([id number])@  Recent ED/IP Admission or Discharge?   None  Attempted to reach patient, but was unsuccessful.  Plan to attempt again 8/29/17 If [facility], send letter.  Trinidad Gutierrez

## 2017-08-22 RX ORDER — LEVONORGESTREL/ETHIN.ESTRADIOL 0.15-0.03
TABLET ORAL
Qty: 30 TABLET | Refills: 0 | Status: SHIPPED | OUTPATIENT
Start: 2017-08-22 | End: 2017-08-29

## 2017-08-29 DIAGNOSIS — G47.419 NARCOLEPSY WITHOUT CATAPLEXY(347.00): ICD-10-CM

## 2017-08-29 RX ORDER — LEVONORGESTREL/ETHIN.ESTRADIOL 0.15-0.03
200 TABLET ORAL DAILY
Qty: 30 TABLET | Refills: 0 | Status: SHIPPED | OUTPATIENT
Start: 2017-08-29 | End: 2017-10-03

## 2017-10-03 DIAGNOSIS — G47.419 NARCOLEPSY WITHOUT CATAPLEXY(347.00): ICD-10-CM

## 2017-10-03 RX ORDER — LEVONORGESTREL/ETHIN.ESTRADIOL 0.15-0.03
200 TABLET ORAL DAILY
Qty: 30 TABLET | Refills: 0 | Status: SHIPPED | OUTPATIENT
Start: 2017-10-03 | End: 2017-10-25

## 2017-10-05 DIAGNOSIS — F33.1 MODERATE EPISODE OF RECURRENT MAJOR DEPRESSIVE DISORDER (H): ICD-10-CM

## 2017-10-05 RX ORDER — ESCITALOPRAM OXALATE 10 MG/1
10 TABLET ORAL DAILY
Qty: 30 TABLET | Refills: 1 | Status: SHIPPED | OUTPATIENT
Start: 2017-10-05 | End: 2018-01-05

## 2017-10-25 DIAGNOSIS — G47.419 NARCOLEPSY WITHOUT CATAPLEXY(347.00): ICD-10-CM

## 2017-11-01 RX ORDER — LEVONORGESTREL/ETHIN.ESTRADIOL 0.15-0.03
200 TABLET ORAL
Qty: 60 TABLET | Refills: 0 | Status: SHIPPED | OUTPATIENT
Start: 2017-11-01 | End: 2017-12-01

## 2017-12-01 DIAGNOSIS — G47.419 NARCOLEPSY WITHOUT CATAPLEXY(347.00): ICD-10-CM

## 2017-12-04 NOTE — ED PROVIDER NOTES
MRN:[id number]                      After Visit Summary   12/4/2017    Lindsey Hale    MRN: [id number]           Thank you!     Thank you for choosing [facility] for your care. Our goal is always to provide you with excellent care. Hearing back from our patients is one way we can continue to improve our services. Please take a few minutes to complete the written survey that you may receive in the mail after you visit with us. Thank you!        Patient Information     Date Of Birth          9/16/1926        Designated Caregiver       Most Recent Value    Caregiver    Will someone help with your care after discharge? no      About your hospital stay     You were admitted on:  December 4, 2017 You last received care in the:  [place] [address] [facility]    You were discharged on:  December 8, 2017        Reason for your hospital stay       Further management of left femoral neck fracture.                  Who to Call     For medical emergencies, please call 911.  For non-urgent questions about your medical care, please call your primary care provider or clinic, [phone]  For questions related to your surgery, please call your surgery clinic        Attending Provider     Provider Specialty    Brittanie Perez MD Emergency Medicine    [facility]       Primary Care Provider Office Phone # Fax #    Jeffery Sherwood [phone]      After Care Instructions     Activity - Up with assistive device       WBAT LLE. Walker for ambulation assistance. Strict posterior hip precautions x4-6 weeks. No hip IR, no hip flexion greater than 80 degrees, no crossing legs. Abduction pillow in place at all times while laying down, resting or sleeping.            Advance Diet as Tolerated       Follow this diet upon discharge: Orders Placed This Encounter      Room Service      Moderate Consistent CHO Diet            Daily weights       Call  "  History     Chief Complaint   Patient presents with     Dizziness     notes dizziness off and on for the last 1 1/2 yrs. notes dizzy earlier. denies dizziness of present.      The history is provided by the patient.     Kina Pond is a 31 year old female who presented to the ED ambulatory for evaluation of intermittent dizziness and frontal head pain.  These symptoms have been present intermittently for over one year.  She typically takes Xanax and dramamine.  Frontal headache is described a \"sinus pain\" for approx one day.  NO fevers.  No falls.  No difficulty walking or talking.  Dizziness is described as \"the room spinning\" and is \"worse when I move my head fast.\"    Past Medical History:   Diagnosis Date     Chemical dependency (H) 10/8/2014     Major depression 10/8/2014     Major depressive disorder, recurrent episode, unspecified 2011     Tobacco abuse      Tobacco use disorder 2011      Past Surgical History:   Procedure Laterality Date     BREAST SURGERY  2013    right lumpectomy     C ANESTH,VAGINAL DELIVERY  2012    Pregnancy full-term; 4.00 hr labor ; APGAR:9 (1 min) 9 (5 min)  (10 min)  -1st degree perineal laceration - Pitocin     C ANESTH,VAGINAL DELIVERY      Pregnancy; 12 hr labor ; 40 week 6 lb(s) 12 oz Male     C ANESTH,VAGINAL DELIVERY      Pregnancy; , 34 week 4 lb(s) 12 oz Male; in hospital for 1 week, ruptured membranes, resealed ([place], [place])     C INDUCED ABORTN BY DIL/EVAC      Pregnancy, induced  due to birth defect; unknown sex       CHOLECYSTECTOMY        Social History     Social History     Marital status:      Spouse name: N/A     Number of children: N/A     Years of education: N/A     Occupational History     [employer]     Social History Main Topics     Smoking status: Current Every Day Smoker     Packs/day: 0.50     Years: 20.00     Types: Cigarettes     Last attempt to quit: 2016     " Provider for weight gain of more than 2 pounds per day or 5 pounds per week.            Fall precautions           General info for SNF       Length of Stay Estimate: Short Term Care: Estimated # of Days <30  Condition at Discharge: Improving  Level of care:skilled   Rehabilitation Potential: Fair  Admission H&P remains valid and up-to-date: Yes  Recent Chemotherapy: N/A  Use Nursing Home Standing Orders: Yes            Intake and output       Every shift            Mantoux instructions       Give two-step Mantoux (PPD) Per Facility Policy Yes            Weight bearing status       WBAT LLE. Walker for ambulation assistance. Strict posterior hip precautions x4-6 weeks. No hip IR, no hip flexion greater than 80 degrees, no crossing legs. Abduction pillow in place at all times while laying down, resting or sleeping.            Wound care (specify)       Site: left posterolateral hip  Instructions: Dressing change to new island dressing as needed. Leave sutures and surgical glue in place over incision(s). Okay to shower, and replace with new island dressing after. Do not submerge incision in water until at least 4 weeks post-op.                  Follow-up Appointments     Follow Up and recommended labs and tests       Patient to follow up with Dr. Darell Nathan at 4 weeks post-op. [facility] Address is [address] [place] [address].    Please call Dr. Nathan's care coordinator, Colette, at [phone] to schedule an appointment.            Follow Up and recommended labs and tests       Follow up with group home physician.  The following labs/tests are recommended: Hgb.  F/u Ortho as previously arranged.                  Your next 10 appointments already scheduled     Dec 28, 2017  2:00 PM CST   Remote PPM Check with [facility]1   [facility] ([facility])    [address] [place] [address] [address]  [facility] [id number] [phone]     Smokeless tobacco: Never Used      Comment: cutting down to 4-5 cig daily      Alcohol use Yes      Comment: rare     Drug use: No     Sexual activity: Not on file     Other Topics Concern     Blood Transfusions Yes     permits      Caffeine Concern Yes     soda, 3 cups daily      Social History Narrative      Family History   Problem Relation Age of Onset     Neurologic Disorder Mother      ALS     Respiratory Father      COPD     Psychotic Disorder Brother      Breast Cancer Sister      Asthma Other      C.A.D. Father      Family H/O      Prostate Cancer Father        I have reviewed the Medications, Allergies, Past Medical and Surgical History, and Social History in the Epic system.    Review of Systems   Constitutional: Negative for activity change, appetite change, chills, fatigue and fever.   HENT: Positive for sinus pressure. Negative for congestion, ear discharge, ear pain, postnasal drip, rhinorrhea, sore throat and trouble swallowing.    Eyes: Negative for photophobia and visual disturbance.   Respiratory: Negative for cough, chest tightness and shortness of breath.    Cardiovascular: Negative for chest pain and palpitations.   Gastrointestinal: Negative for abdominal pain, nausea and vomiting.   Genitourinary: Negative.    Musculoskeletal: Negative for back pain and neck pain.   Skin: Negative.    Neurological: Positive for dizziness and headaches. Negative for speech difficulty, weakness, light-headedness and numbness.   Hematological: Does not bruise/bleed easily.       Physical Exam   BP: 106/76  Heart Rate: 82  Temp: 96.1  F (35.6  C)  Resp: 18  SpO2: 98 %  Physical Exam   Constitutional: She is oriented to person, place, and time. She appears well-developed and well-nourished. No distress.   Pleasant and talkative    HENT:   Head is atraumatic and normocephalic.  External auditory canals are clear and without edema or erythema.  TMs are pearly gray and unremarkable. Posterior pharynx has no swelling,  "       This appointment is for a remote check of your pacemaker.  This is not an appointment at the office.              Additional Services     Occupational Therapy Adult Consult       Evaluate and treat as clinically indicated.    Reason:  deconditioned            Physical Therapy Adult Consult       Evaluate and treat as clinically indicated.    Reason:  deconditioned                  Future tests that were ordered for you     AntiEmbolism Stockings       Bilateral below knee length.On in the morning, off at night                  Pending Results     Date and Time Order Name Status Description    2017 0754 XR Chest Port 1 View In process             Statement of Approval     Ordered          17 1228  I have reviewed and agree with all the recommendations and orders detailed in this document.  EFFECTIVE NOW     Approved and electronically signed by:  Osito Crowder MD             Admission Information     Date & Time Provider Department Dept. Phone    2017 Osito Crowder MD [place] [address] [facility] [phone]      Your Vitals Were     Blood Pressure Pulse Temperature Respirations Weight Pulse Oximetry    120/54 83 98.6  F (37  C) (Oral) 16 86.7 kg (191 lb 1.6 oz) 91%    BMI (Body Mass Index)                   35.52 kg/m2           [employer] Information     [employer] lets you send messages to your doctor, view your test results, renew your prescriptions, schedule appointments and more. To sign up, go to [email or web address] . Click on \"Log in\" on the left side of the screen, which will take you to the Welcome page. Then click on \"Sign up Now\" on the right side of the page.     You will be asked to enter the access code listed below, as well as some personal information. Please follow the directions to create your username and password.     Your access code is: [email or web address]  Expires: 2017  9:18 AM     Your access code will  in 90 days. If you need help or a " erythema, or exudate.  Oral mucosa is pink and moist, without petechia, lesions, or ulcer.  Tongue is midline.  Palate rises symmetric.    Eyes:   Extraocular movements are intact and smooth throughout the H.  Conjunctiva are normal.  There is no horizontal or vertical nystagmus.  Pupils are equil in size and reactive to light. Lids are unremarkable.     Neck: Normal range of motion. Neck supple.   Cardiovascular: Normal rate and regular rhythm.    Pulmonary/Chest: Effort normal and breath sounds normal.   Abdominal: Soft. There is no tenderness.   Musculoskeletal: She exhibits no edema.   Lymphadenopathy:     She has no cervical adenopathy.   Neurological: She is alert and oriented to person, place, and time.   Neurological examination:  That the patient was awake and alert, the attention, orientation, concentration, language, memory and fund of knowledge were all normal.  The patient had no neglect.    Normal gait.  Normal finger to nose.     Cranial nerve examination: revealed that for cranial nerve   II: the pupils were reactive and the visual field were full  III, IV, and VI, the extraocular movements were full.    V: facial sensation intact bilateral   VII: facial movements are symmetric  VIII: hearing intact to voice  IX & X: the soft palate rises symmetrically   XI: shoulder movements are symmetric  XII: tongue is midline   Skin: Skin is warm and dry.   Psychiatric: She has a normal mood and affect.   Nursing note and vitals reviewed.      ED Course     ED Course     Procedures             Critical Care time:  none               Labs Ordered and Resulted from Time of ED Arrival Up to the Time of Departure from the ED - No data to display    Assessments & Plan (with Medical Decision Making)   Findings as above.  No indication for labs or imaging.  Symptoms are chronic and denies currently.  Exam is entirely negative.  We will trial Antivert and Flonase.  Refill Xanax to see if this helps.  Follow-up in the  clinic.  Return here for any worsening headaches, difficulty walking or talking, worsening dizziness, or simply return for ANY other concerns or questions. Ms. Pond voiced complete understanding and was happy and agreeable.     I have reviewed the nursing notes.    I have reviewed the findings, diagnosis, plan and need for follow up with the patient.    New Prescriptions    MECLIZINE (ANTIVERT) 25 MG TABLET    Take 1 tablet (25 mg) by mouth every 6 hours as needed for dizziness       Final diagnoses:   Dizziness   Frontal sinus pain       3/26/2017   [facility]     David Millan PA-C  03/26/17 1240       David Millan PA-C  03/26/17 1240     new code, please call your [place] clinic or [phone].        Care EveryWhere ID     This is your Care EveryWhere ID. This could be used by other organizations to access your [place] medical records  [id number]        Equal Access to Services     PALLAVI TERRY : Hadii aad ku hadjayo Sorheaali, waaxda luqadaha, qaybta kaalmada adebulmaro, maritza elsain hayaaflavia vieiraradha snell judy quesada. So [facility] [phone].    ATENCIÓN: Si habla español, tiene a rolon disposición servicios gratuitos de asistencia lingüística. Llame al [phone].    We comply with applicable federal civil rights laws and [place] laws. We do not discriminate on the basis of race, color, national origin, age, disability, sex, sexual orientation, or gender identity.               Review of your medicines      START taking        Dose / Directions    acetaminophen 325 MG tablet   Commonly known as:  TYLENOL   Used for:  Closed fracture of neck of left femur, initial encounter (H)        Dose:  650 mg   Take 2 tablets (650 mg) by mouth every 4 hours as needed for other (surgical pain)   Quantity:  40 tablet   Refills:  0       oxyCODONE IR 5 MG tablet   Commonly known as:  ROXICODONE   Used for:  Closed fracture of neck of left femur, initial encounter (H)        Dose:  5 mg   Take 1 tablet (5 mg) by mouth every 3 hours as needed for moderate to severe pain   Quantity:  20 tablet   Refills:  0         CONTINUE these medicines which have NOT CHANGED        Dose / Directions    budesonide-formoterol 160-4.5 MCG/ACT Inhaler   Commonly known as:  SYMBICORT        Dose:  2 puff   Inhale 2 puffs into the lungs 2 times daily   Refills:  0       Carboxymethylcellulose Sod PF 0.5 % Soln ophthalmic solution   Commonly known as:  REFRESH PLUS        Dose:  1 drop   1 drop 3 times daily as needed for dry eyes   Refills:  0       flecainide acetate 150 MG Tabs   Used for:  Atrial fibrillation (H)        1/2 tab bid   Quantity:  90 tablet   Refills:  3        furosemide 20 MG tablet   Commonly known as:  LASIX   Used for:  Atrial fibrillation, unspecified        Dose:  20 mg   Take 1 tablet (20 mg) by mouth daily Pt taking one tab of 20mg furosemide every day and two tabs every other day if needed   Quantity:  181 tablet   Refills:  3       levothyroxine 75 MCG tablet   Commonly known as:  SYNTHROID/LEVOTHROID   Used for:  Hypothyroidism, unspecified type        Dose:  75 mcg   Take 1 tablet (75 mcg) by mouth daily   Quantity:  90 tablet   Refills:  3       metoprolol 50 MG tablet   Commonly known as:  LOPRESSOR   Used for:  Atrial fibrillation, unspecified type (H), Benign essential hypertension        TAKE 1 AND 1/2 TABLETS BY MOUTH TWICE DAILY   Quantity:  270 tablet   Refills:  3       multivitamin Tabs tablet        Dose:  1 tablet   Take 1 tablet by mouth daily as needed   Refills:  0       rivaroxaban ANTICOAGULANT 20 MG Tabs tablet   Commonly known as:  XARELTO   Used for:  Atrial fibrillation, unspecified type (H)        Dose:  20 mg   Take 1 tablet (20 mg) by mouth daily (with dinner)   Quantity:  90 tablet   Refills:  2       STOOL SOFTENER 100 MG capsule   Generic drug:  docusate sodium        Dose:  100 mg   Take 100 mg by mouth 2 times daily as needed for constipation   Refills:  0       VITAMIN B COMPLEX-C Caps        Dose:  1 capsule   Take 1 capsule by mouth daily   Refills:  0       VITAMIN D (CHOLECALCIFEROL) PO        Dose:  1000 Units   Take 1,000 Units by mouth daily   Refills:  0         STOP taking     CEPHALEXIN PO           IBUPROFEN PO           traMADol 50 MG tablet   Commonly known as:  ULTRAM                Where to get your medicines      These medications were sent to [facility] [address] [place] [address] RAJENDRA NARVAEZ - [address] AT [facility]  [address], [place] [address]     Phone:  [phone]     acetaminophen 325 MG tablet         Some of these will need a paper prescription and others can be bought over the  counter. Ask your nurse if you have questions.     Bring a paper prescription for each of these medications     oxyCODONE IR 5 MG tablet                Protect others around you: Learn how to safely use, store and throw away your medicines at www.disposemymeds.org.             Medication List: This is a list of all your medications and when to take them. Check marks below indicate your daily home schedule. Keep this list as a reference.      Medications           Morning Afternoon Evening Bedtime As Needed    acetaminophen 325 MG tablet   Commonly known as:  TYLENOL   Take 2 tablets (650 mg) by mouth every 4 hours as needed for other (surgical pain)   Last time this was given:  975 mg on 12/8/2017  6:07 AM                                budesonide-formoterol 160-4.5 MCG/ACT Inhaler   Commonly known as:  SYMBICORT   Inhale 2 puffs into the lungs 2 times daily                                Carboxymethylcellulose Sod PF 0.5 % Soln ophthalmic solution   Commonly known as:  REFRESH PLUS   1 drop 3 times daily as needed for dry eyes                                flecainide acetate 150 MG Tabs   1/2 tab bid   Last time this was given:  75 mg on 12/8/2017  8:37 AM                                furosemide 20 MG tablet   Commonly known as:  LASIX   Take 1 tablet (20 mg) by mouth daily Pt taking one tab of 20mg furosemide every day and two tabs every other day if needed   Last time this was given:  20 mg on 12/8/2017  8:38 AM                                levothyroxine 75 MCG tablet   Commonly known as:  SYNTHROID/LEVOTHROID   Take 1 tablet (75 mcg) by mouth daily   Last time this was given:  75 mcg on 12/8/2017  8:38 AM                                metoprolol 50 MG tablet   Commonly known as:  LOPRESSOR   TAKE 1 AND 1/2 TABLETS BY MOUTH TWICE DAILY   Last time this was given:  75 mg on 12/8/2017  8:37 AM                                multivitamin Tabs tablet   Take 1 tablet by mouth daily as needed                                 oxyCODONE IR 5 MG tablet   Commonly known as:  ROXICODONE   Take 1 tablet (5 mg) by mouth every 3 hours as needed for moderate to severe pain   Last time this was given:  5 mg on 12/8/2017  6:07 AM                                rivaroxaban ANTICOAGULANT 20 MG Tabs tablet   Commonly known as:  XARELTO   Take 1 tablet (20 mg) by mouth daily (with dinner)   Last time this was given:  10 mg on 12/7/2017  3:37 PM                                STOOL SOFTENER 100 MG capsule   Take 100 mg by mouth 2 times daily as needed for constipation   Generic drug:  docusate sodium                                VITAMIN B COMPLEX-C Caps   Take 1 capsule by mouth daily                                VITAMIN D (CHOLECALCIFEROL) PO   Take 1,000 Units by mouth daily

## 2017-12-05 RX ORDER — LEVONORGESTREL/ETHIN.ESTRADIOL 0.15-0.03
200 TABLET ORAL
Qty: 60 TABLET | Refills: 0 | Status: SHIPPED | OUTPATIENT
Start: 2017-12-05 | End: 2018-03-02

## 2018-01-05 DIAGNOSIS — F33.1 MODERATE EPISODE OF RECURRENT MAJOR DEPRESSIVE DISORDER (H): ICD-10-CM

## 2018-01-05 NOTE — LETTER
January 9, 2018      Kina Pond  [address] [place] [id number]        Dear Kina,       APPOINTMENT REMINDER:   Our records indicates that it is time for you to be seen for a mediation follow up.      Your current medication request for Lexapro will be approved for one refill but you will need to be seen before any additional refills can be approved.  Taking care of your health is important to us, and ongoing visits with your provider are vital to your care.    We look forward to seeing you in the near future.  You may call our office at [phone] to schedule a visit.     Please disregard this notice if you have already made an appointment.        Sincerely,        DAYANNA Cai

## 2018-01-05 NOTE — TELEPHONE ENCOUNTER
lexapro       Last Written Prescription Date: 10/5/17  Last Fill Quantity: 30,  # refills: 1   Last Office Visit with G, [facility] or [facility] prescribing provider: 6/22/17

## 2018-01-09 RX ORDER — ESCITALOPRAM OXALATE 10 MG/1
10 TABLET ORAL DAILY
Qty: 30 TABLET | Refills: 0 | Status: SHIPPED | OUTPATIENT
Start: 2018-01-09

## 2018-03-02 DIAGNOSIS — G47.419 NARCOLEPSY WITHOUT CATAPLEXY(347.00): ICD-10-CM

## 2018-03-06 RX ORDER — LEVONORGESTREL/ETHIN.ESTRADIOL 0.15-0.03
200 TABLET ORAL
Qty: 60 TABLET | Refills: 0 | Status: SHIPPED | OUTPATIENT
Start: 2018-03-06 | End: 2018-04-10

## 2018-04-10 DIAGNOSIS — G47.419 NARCOLEPSY WITHOUT CATAPLEXY(347.00): ICD-10-CM

## 2018-04-10 RX ORDER — MODAFINIL 200 MG/1
200 TABLET ORAL
Qty: 60 TABLET | Refills: 0 | Status: SHIPPED | OUTPATIENT
Start: 2018-05-10

## 2018-04-10 RX ORDER — MODAFINIL 200 MG/1
200 TABLET ORAL
Qty: 60 TABLET | Refills: 0 | Status: SHIPPED | OUTPATIENT
Start: 2018-06-08

## 2018-04-10 RX ORDER — LEVONORGESTREL/ETHIN.ESTRADIOL 0.15-0.03
200 TABLET ORAL
Qty: 60 TABLET | Refills: 0 | Status: SHIPPED | OUTPATIENT
Start: 2018-04-10

## 2018-07-07 ENCOUNTER — HEALTH MAINTENANCE LETTER (OUTPATIENT)
Age: 33
End: 2018-07-07

## 2018-09-07 NOTE — NURSING NOTE
"Chief Complaint   Patient presents with     Consult     keratinous cyst- jawline bilateral- Raymond        Initial /60 (BP Location: Left arm, Patient Position: Chair, Cuff Size: Adult Regular)  Pulse 72  Temp 97.9  F (36.6  C) (Tympanic)  Ht 5' 3\" (1.6 m)  Wt 117 lb (53.1 kg)  SpO2 99%  BMI 20.73 kg/m2 Estimated body mass index is 20.73 kg/(m^2) as calculated from the following:    Height as of this encounter: 5' 3\" (1.6 m).    Weight as of this encounter: 117 lb (53.1 kg).  Medication Reconciliation: complete       Tatianna Rosario LPN      " [phone]

## 2019-08-12 NOTE — TELEPHONE ENCOUNTER
Last refill for Lexapro 4.3.17 #30.  Last office visit 5.4.17 where med was D/C'd, reason: Stopped by patient.  Please advise.  Thank you.   What Type Of Note Output Would You Prefer (Optional)?: Bullet Format How Severe Are Your Spot(S)?: mild Have Your Spot(S) Been Treated In The Past?: has not been treated Hpi Title: Evaluation of Skin Lesions Additional History: The patient wants to know whether any of the growths are malignant because they are too numerous for the patient self-monitor. New lesions appear frequently. Patient is not certain of either changes in, nor exact duration of, each growth because there are too many to track.

## 2022-10-14 NOTE — TELEPHONE ENCOUNTER
"[facility]  @FLOW([id number])@      Social Work Care Navigator Note      Patient: Kina Pond  Date: April 25, 2017  Preferred Name: Kina    Previous PHQ-9:   PHQ-9 SCORE 12/1/2016 1/18/2017 4/14/2017   Total Score - - -   Total Score 9 14 14     Previous DANIEL-7:   DANIEL-7 SCORE 12/1/2016 1/18/2017 4/14/2017   Total Score 17 20 15     ACE LEVEL:  ACE Score (Last Two) 11/10/2016 12/1/2016   ACE Raw Score 28 32   Activation Score 50 62.6   ACE Level 2 3       Preferred Contact: @[employer]([id number])@  Type of Contact Today: Phone call (patient reached)      Data: (subjective / Objective):  Patient Goals Areas: @FLOW([id number])@  Patient Stated Goals:  Take my kids to the Easter Egg hunt instead of sleeping\"  I can be outside with my kids 10 min a day.\"  Recent ED/IP Admission or Discharge?   None     Objectives Addressed Today:    Did not go to Easter egg hunt-but did go to [place] with family-and did not sleep on couch. Her children are reminding her to be outside at least 15 minutes a day- her mood has improved.     She did wean herself off of Lexapro-and she feels as though she has more energy, and sleeping as much. Will talk to Makayla Khoury's nurse about this.  Spent Earth Day in [place] with family at the [facility] [place].      Current Stressors / Issues:  Called last week for Xanax refill-not done yet.    Intervention:  Motivational Interviewing: Expressed Empathy/Understanding, Supported Autonomy, Collaboration, Evocation and Permission to raise concern or advise   Target Behavior(s): Explored thoughts about taking an anti-depressant    Assessment: (Progress on Goals / Homework):  Is doing more outside with children-and is feeling some relief.    Plan: (Homework, other):   Patient was encouraged to continue to seek condition-related information and education.      Scheduled a Phone follow up appointment with TAMEKA SAN in 2 weeks     Patient has set self-identified goals and will monitor progress " until the next appointment on: 05/9/17.     Shannan Hirsch, Social Work Care Coordinator               Next 5 appointments (look out 90 days)     May 04, 2017  9:00 AM CDT   (Arrive by 8:45 AM)   Return Visit with Pamela Peters [facility]    [address] [place] [address] [id number] [phone]                 Clindamycin Pregnancy And Lactation Text: This medication can be used in pregnancy if certain situations. Clindamycin is also present in breast milk.